# Patient Record
Sex: FEMALE | Race: BLACK OR AFRICAN AMERICAN | NOT HISPANIC OR LATINO | URBAN - METROPOLITAN AREA
[De-identification: names, ages, dates, MRNs, and addresses within clinical notes are randomized per-mention and may not be internally consistent; named-entity substitution may affect disease eponyms.]

---
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
## 2019-11-19 ENCOUNTER — EMERGENCY (EMERGENCY)
Facility: HOSPITAL | Age: 19
LOS: 1 days | Discharge: HOME | End: 2019-11-19
Attending: EMERGENCY MEDICINE
Payer: MEDICAID

## 2019-11-19 VITALS
WEIGHT: 134.92 LBS | DIASTOLIC BLOOD PRESSURE: 81 MMHG | RESPIRATION RATE: 16 BRPM | TEMPERATURE: 98 F | HEIGHT: 63 IN | HEART RATE: 81 BPM | SYSTOLIC BLOOD PRESSURE: 113 MMHG | OXYGEN SATURATION: 99 %

## 2019-11-19 LAB — HCG UR QL: NEGATIVE — SIGNIFICANT CHANGE UP

## 2019-11-19 PROCEDURE — 87491 CHLMYD TRACH DNA AMP PROBE: CPT

## 2019-11-19 PROCEDURE — 81025 URINE PREGNANCY TEST: CPT

## 2019-11-19 PROCEDURE — 99283 EMERGENCY DEPT VISIT LOW MDM: CPT

## 2019-11-19 PROCEDURE — 87591 N.GONORRHOEAE DNA AMP PROB: CPT

## 2019-11-19 NOTE — ED PROVIDER NOTE - PATIENT PORTAL LINK FT
You can access the FollowMyHealth Patient Portal offered by [facility] by registering at the following website: [email or web address]. By joining [employer]’s FollowMyHealth portal, you will also be able to view your health information using other applications (apps) compatible with our system.

## 2019-11-19 NOTE — ED PROVIDER NOTE - NSFOLLOWUPINSTRUCTIONS_ED_ALL_ED_FT
Please [facility] with [facility] or your Gyn as planned'    We will call you if the Urine Testing results positive Tomorrow or Thursday.      Menstruation  Menstruation, also known as a menstrual period, is the monthly shedding of the lining of the uterus. The uterus is the organ in the lower abdomen where a baby grows during pregnancy. Menstruation involves the passing of blood, tissue, fluid, and mucus. The flow of blood usually occurs during 3–7 consecutive days each month.  Girls usually start their periods between the ages of 12 and 14, but some girls may be older or younger when they start their period. Some girls have regular monthly menstrual cycles right from the beginning. However, it is not unusual to have only a couple of drops of blood or spotting when first starting to have periods. It is also not unusual to have two periods a month or miss a month or two when first starting to have periods. Women will continue to have periods until they reach menopause, which usually occurs between the ages of 48 and 55.  What are the symptoms?  During your period, you pass blood, tissue, fluid, and mucus out of your vagina. Periods are different for each woman and girl. You may experience:  Bleeding that lasts for 3–7 days. A little more or less bleeding is normal. Occasional heavy bleeding. Cramps in the lower abdomen. Aching or pain in the lower back area. Sore breasts .Dizziness. Nausea. Diarrhea. Other symptoms may occur 5–10 days before your menstrual period starts. These symptoms are referred to as premenstrual syndrome (PMS). These symptoms can include:  Headache. Breast tenderness and swelling. Bloating. Tiredness (fatigue).Mood changes. Craving for certain foods.  How does the menstrual cycle happen?  A period is part of a woman's menstrual cycle, which is a series of changes that the body goes through to get ready to become pregnant. The menstrual cycle usually lasts about 28 days, meaning that you will get your period about every 28 days if you do not get pregnant. However, some women get their periods as soon as every 21 days or as late as every 35 days.  Hormones control the menstrual cycle. Hormones are chemicals that the body produces to regulate different body functions. These hormones trigger changes in your uterus. Every month, the lining of your uterus gets thicker to prepare for pregnancy. And every month that you do not get pregnant, your uterus gets rid of its thick lining and cleans itself out. This is your period.  How do I know if my period is not normal?  Periods are different for everyone. Your period may last for a longer or shorter time than usual, and bleeding may be light or heavy.  Signs that your period may not be normal include:  Bleeding very heavily, such as soaking through a tampon or pad in 1–2 hours .Bleeding for many more days than normal. Bleeding after you have sex. Cramps that are so painful that you cannot do your daily activities. Cramps that get much worse than they used to be. Bleeding in between periods. Missing your period for longer than 3 months. Your menstrual cycle becoming irregular, when it used to be regular.  Follow these instructions at home:  Keep track of your periods by using a calendar. If you use tampons, use the least absorbent possible to avoid complications such as toxic shock syndrome. Do not leave tampons in the vagina overnight or longer than 8 hours. Wear a sanitary pad overnight. To help relieve pain and discomfort during your period:  Take an over-the-counter pain reliever as told by your health care provider. Use a heating pad or heat wrap on your abdomen to ease cramping. Exercise 3–5 times a week or more. Avoid foods and drinks that you know will make your symptoms worse before or during your period. This includes foods that contain:  Caffeine. Salt. Sugar.   Contact a health care provider if:  You have signs that your period may not be normal. You develop a fever with your period. Your periods are lasting more than 7 days. You develop clots with your period and never had clots before. You cannot get relief for your symptoms from over-the-counter medicine. Your period has not started, and it has been longer than 35 days.   Get help right away if:  Your period is so heavy that you have to change pads or tampons every 30 minutes. You have any symptoms of toxic shock syndrome (TSS), such as:  A high fever. Vomiting or diarrhea. Red skin that looks like a sunburn. Red eyes. Fainting or feeling dizzy. Sore throat. Muscle aches. If you develop any of these symptoms, visit your health care provider immediately. TSS is a serious health condition that can be caused by wearing a tampon for too long.  Summary  Menstruation, also known as a menstrual period, is the monthly shedding of the lining of the uterus. During your period, you pass blood, tissue, fluid, and mucus out of your vagina. Keep track of your periods by using a calendar. Contact a health care provider if you have signs that your period may not be normal. This information is not intended to replace advice given to you by your health care provider. Make sure you discuss any questions you have with your health care provider.

## 2019-11-19 NOTE — ED PROVIDER NOTE - PROGRESS NOTE DETAILS
HCG neg. Exam benign. C/w normal menses. Has f/u with [facility] this coming week. Supportive care at home. Return precautions discussed

## 2019-11-19 NOTE — ED PROVIDER NOTE - NSFOLLOWUPCLINICS_GEN_ALL_ED_FT
[facility] Gynecology and Obstetrics  Gynceology/OB  [address] [place] [address]  Phone: [phone]  Fax:   Follow Up Time: Routine

## 2019-11-19 NOTE — ED PROVIDER NOTE - OBJECTIVE STATEMENT
19y female with no significant PMH presenting with vaginal bleeding. She went to [facility] on Oct[facility] 6[facility] and had a medication induced , took a pill, did not know name of pill. She was 6 weeks at that time. She developed cramping, nausea and vaginal bleeding that day, she had vaginal bleeding for a few days and then had spotting until 4 days ago when the bleeding increased, associated crampy abdominal pain. Denies nausea, vomiting, weakness, fatigue. No history of anemia. 19y female with no significant PMH presenting with vaginal bleeding. She went to [facility] on Oct. 6th and had a medication induced , took a pill, did not know name of pill. She was 6 weeks at that time. She developed cramping, nausea and vaginal bleeding that day, she had vaginal bleeding for a few days and then had spotting that nearly resolved until 4 days ago when the bleeding increased, associated crampy abdominal pain, c/w her prior usual periods. Denies nausea, vomiting, weakness, fatigue. No history of anemia.

## 2019-11-19 NOTE — ED ADULT NURSE NOTE - OBJECTIVE STATEMENT
Pt presents for eval of continued vaginal bleeding after taking " pill" on 10/6.  Bleeding wad initially heavy, then subsided but increased a few days ago. She had cramping earlier as well, but that has resolved at this time.  No lightheadedness.

## 2019-11-19 NOTE — ED PROVIDER NOTE - NS ED ROS FT
Gen: No fever, No chills  Resp: No SOB  Cardiovascular: No chest pain or palpitations  GI: + crampy lower abdominal pain. No nausea/vomiting  :  No change in urine output or frequency; no dysuria  Neuro: No headache; No weakness  Remainder negative, except as per the HPI

## 2019-11-19 NOTE — ED PROVIDER NOTE - ATTENDING CONTRIBUTION TO CARE
Nevaeh Velez MD - Attending Physician: I have personally seen and examined this patient with the resident/fellow.  I have fully participated in the care of this patient. I have reviewed all pertinent clinical information, including history, physical exam, plan and the Resident/Fellow’s note and agree except as noted. See MDM

## 2019-11-19 NOTE — ED PROVIDER NOTE - CLINICAL SUMMARY MEDICAL DECISION MAKING FREE TEXT BOX
19y female with vaginal bleeding. Had an  6.5wks ago, had spotting since , developed increased bleeding 4 days ago. Likely resumption of her normal period, spotting to be expected after , concern for retained products but less likely given no fevers, n/v, will check upreg, and check gc/chlamydia. 19y female with vaginal bleeding. Had an  6.5wks ago, had spotting since , developed increased bleeding 4 days ago. Likely resumption of her normal period, spotting to be expected after , concern for retained products but less likely given no fevers, n/v, will check upreg, and check gc/chlamydia.    Nevaeh Velez MD - Attending Physician: Pt here with vaginal bleeding x 4 days, recent medical  approx 6 weeks ago. History and exam c/w resumption of normal Menses. No concern for anemia. Check HCG, pt wants GC/Chl testing

## 2019-11-19 NOTE — ED PROVIDER NOTE - PHYSICAL EXAMINATION
Gen: AAOx3, non-toxic  HEENT: normal conjunctiva  Lung: CTAB, no respiratory distress, no wheezes/rhonchi/rales B/L, speaking in full sentences  CV: RRR, no murmurs, rubs or gallops  Abd: soft, NTND, no guarding, no CVA tenderness  MSK: no visible deformities  Skin: Warm, well perfused, no rash  Psych: normal affect. Gen: AAOx3, non-toxic  HEENT: normal conjunctiva  Lung: CTAB, no respiratory distress, no wheezes/rhonchi/rales B/L, speaking in full sentences  CV: RRR, no murmurs, rubs or gallops  Abd: soft, NTND, no guarding, no CVA tenderness  : Chaperoned by MD Nettles: small amount dark blood in vault, no active bleeding, no pooling  MSK: no visible deformities  Skin: Warm, well perfused, no rash  Psych: normal affect.

## 2019-11-20 LAB
C TRACH RRNA SPEC QL NAA+PROBE: SIGNIFICANT CHANGE UP
N GONORRHOEA RRNA SPEC QL NAA+PROBE: SIGNIFICANT CHANGE UP
SPECIMEN SOURCE: SIGNIFICANT CHANGE UP

## 2020-02-14 ENCOUNTER — EMERGENCY (EMERGENCY)
Facility: HOSPITAL | Age: 20
LOS: 1 days | Discharge: HOME | End: 2020-02-14
Attending: EMERGENCY MEDICINE | Admitting: EMERGENCY MEDICINE
Payer: MEDICAID

## 2020-02-14 VITALS
RESPIRATION RATE: 16 BRPM | OXYGEN SATURATION: 100 % | TEMPERATURE: 98 F | HEART RATE: 72 BPM | DIASTOLIC BLOOD PRESSURE: 65 MMHG | SYSTOLIC BLOOD PRESSURE: 120 MMHG

## 2020-02-14 PROCEDURE — 73120 X-RAY EXAM OF HAND: CPT | Mod: 26,LT

## 2020-02-14 PROCEDURE — 73110 X-RAY EXAM OF WRIST: CPT | Mod: 26,LT

## 2020-02-14 PROCEDURE — 99284 EMERGENCY DEPT VISIT MOD MDM: CPT

## 2020-02-14 RX ORDER — IBUPROFEN 200 MG
600 TABLET ORAL ONCE
Refills: 0 | Status: COMPLETED | OUTPATIENT
Start: 2020-02-14 | End: 2020-02-14

## 2020-02-14 RX ADMIN — Medication 600 MILLIGRAM(S): at 22:58

## 2020-02-14 NOTE — ED ADULT TRIAGE NOTE - CHIEF COMPLAINT QUOTE
left hand pain    pt was bowling and fell.. broke her fall with her left hand which is swollen and painful

## 2020-02-14 NOTE — ED PROVIDER NOTE - PATIENT PORTAL LINK FT
You can access the FollowMyHealth Patient Portal offered by [facility] by registering at the following website: [email or web address]. By joining [employer]’s FollowMyHealth portal, you will also be able to view your health information using other applications (apps) compatible with our system.

## 2020-02-14 NOTE — ED PROVIDER NOTE - NSFOLLOWUPINSTRUCTIONS_ED_ALL_ED_FT
Your XR showed a fracture of a wrist bone.  Splint until follow up with Orthopedics and repeat Xray.  Elevation, Ice, Ibuprofen (Motrin or Advil) 400mg up to 3x per day with food, for pain and swelling.  Follow up with your doctor or return for worsening pain, fever, redness, swelling, numbness, weakness or any signs of distress.

## 2020-02-14 NOTE — ED PROVIDER NOTE - OBJECTIVE STATEMENT
19 y/o female with no pertinent PMHx presents to the ED s/p slip and fall. Pt states slipped and fell yesterday while bowling fell onto outstretched arm, unsure the mechanism of the fall. Pt notes initial had no c/o pain and went home and rest. Pt then woke the next day with severe pain and swelling to dorsum of the Lt hand along with finger tingling, but able to range all finger. Of note Pt with no Hx of Fx and with no other pain or complaint.

## 2020-02-14 NOTE — ED PROVIDER NOTE - CLINICAL SUMMARY MEDICAL DECISION MAKING FREE TEXT BOX
19 y/o female here with fall onto outstretched arm. Plan for XR wrist and hand, NSAIDs, likely splint, and outpatient f/u.

## 2020-02-14 NOTE — ED PROVIDER NOTE - PHYSICAL EXAMINATION
LUE good distal pulses FROM of all digits   +Subject parasthenia to dorsum of digits.  +Tenderness to radial wrist and dorsum at base of first metacarpal   +Swelling between 2-3 metacarpals no ecchymosis  Elbow forearm and shoulder nontender.

## 2020-02-15 NOTE — ED ADULT NURSE NOTE - OBJECTIVE STATEMENT
Pt received in intake rm #12, pt arrives to ED after falling and injuring left hand. Pt seem and evaluated by MD. Pt left hand splinted by provider pt to be DC at this time.

## 2020-02-15 NOTE — ED ADULT NURSE NOTE - NSIMPLEMENTINTERV_GEN_ALL_ED
Implemented All Fall Risk Interventions:  [place] to call system. Call bell, personal items and telephone within reach. Instruct patient to call for assistance. Room bathroom lighting operational. Non-slip footwear when patient is off stretcher. Physically safe environment: no spills, clutter or unnecessary equipment. Stretcher in lowest position, wheels locked, appropriate side rails in place. Provide visual cue, wrist band, yellow gown, etc. Monitor gait and stability. Monitor for mental status changes and reorient to person, place, and time. Review medications for side effects contributing to fall risk. Reinforce activity limits and safety measures with patient and family.

## 2020-02-15 NOTE — ED ADULT NURSE REASSESSMENT NOTE - NS ED NURSE REASSESS COMMENT FT1
Pt DC'd after evaluation by ED provider and after test results were resulted. Pt in no distress. DC home with significant other

## 2020-02-28 ENCOUNTER — APPOINTMENT (OUTPATIENT)
Dept: ORTHOPEDIC SURGERY | Facility: CLINIC | Age: 20
End: 2020-02-28
Payer: MEDICAID

## 2020-02-28 VITALS
BODY MASS INDEX: 28.34 KG/M2 | HEIGHT: 62 IN | DIASTOLIC BLOOD PRESSURE: 78 MMHG | SYSTOLIC BLOOD PRESSURE: 120 MMHG | WEIGHT: 154 LBS | HEART RATE: 94 BPM

## 2020-02-28 DIAGNOSIS — S62.112D DISPLACED FRACTURE OF TRIQUETRUM [CUNEIFORM] BONE, LEFT WRIST, SUBSEQUENT ENCOUNTER FOR FRACTURE WITH ROUTINE HEALING: ICD-10-CM

## 2020-02-28 PROCEDURE — 99204 OFFICE O/P NEW MOD 45 MIN: CPT

## 2020-02-28 PROCEDURE — 73130 X-RAY EXAM OF HAND: CPT | Mod: LT

## 2020-02-28 NOTE — ASSESSMENT
[FreeTextEntry1] : 20 year old female presents with left triquetral avulsion fracture.will wear brace x 4 weeks total. Return appointment will be scheduled for two weeks with repeat XR.

## 2020-02-28 NOTE — PHYSICAL EXAM
[de-identified] : Three views of the left wrist were obtained and reviewed. Triquetral avulsion fracture appreciated. No SL widening on  view. No foreign body. Joint space is well maintained.  [de-identified] : Constitutional: Alert and in no acute distress.\par Psychiatric: Oriented to person, place, and time. Insight and judgement were intact and the affect was normal.\par Cardiovascular: Regular rate assessed through peripheral pulses.\par Pulmonary: Nonlabored breathing on room air.\par Lymphatics: No peripheral lymphadenopathy appreciated.\par \par Musculoskeletal: \par Skin is intact. There is tenderness to palpation over the left triquetrum with deformity and swelling. Remainder of the extremity is atraumatic. There is no tenderness over the volar tubercle of the scaphoid or anatomic snuffbox.\par \par Mobility is full about the shoulders and elbows. Digital flexion and extension is full. Thumb opposition is full to the base of the small fingers bilaterally. \par \par Sensation is intact to light touch in the ulnar, median, and radial nerve distributions. Motor is intact in the ulnar, median, and radial nerve distributions. Fingers are pink and well perfused. Capillary refill is brisk.

## 2020-02-28 NOTE — HISTORY OF PRESENT ILLNESS
[Right] : right hand dominant [FreeTextEntry1] : She presents today for evaluation of her left triquetral avulsion fracture \par Date of injury: 12/13/20\par Method of injury: ground level fall \par \par The patient initially presented to the emergency room where XR were obtained and the fracture was diagnosed.  A splint was applied and referral submitted to hand surgery for further follow-up. She continues to have dorsal wrist pain. She describes the pain as 4/10. The pain is described as intermittent. Ice helps alleviate the pain. She has been taking over the counter tylenol to help improve the pain. Sleep worsens pain. She does have a history of a pulmonary embolism after gettign the Depo shot. She is not currently taking blood thinners\par

## 2020-03-12 ENCOUNTER — EMERGENCY (EMERGENCY)
Facility: HOSPITAL | Age: 20
LOS: 1 days | Discharge: HOME | End: 2020-03-12
Attending: EMERGENCY MEDICINE | Admitting: EMERGENCY MEDICINE
Payer: MEDICAID

## 2020-03-12 VITALS
OXYGEN SATURATION: 100 % | DIASTOLIC BLOOD PRESSURE: 63 MMHG | SYSTOLIC BLOOD PRESSURE: 126 MMHG | RESPIRATION RATE: 16 BRPM | HEART RATE: 86 BPM | TEMPERATURE: 99 F

## 2020-03-12 VITALS
HEART RATE: 81 BPM | DIASTOLIC BLOOD PRESSURE: 59 MMHG | TEMPERATURE: 98 F | SYSTOLIC BLOOD PRESSURE: 101 MMHG | RESPIRATION RATE: 16 BRPM | OXYGEN SATURATION: 98 %

## 2020-03-12 LAB
ALBUMIN SERPL ELPH-MCNC: 4.3 G/DL — SIGNIFICANT CHANGE UP (ref 3.3–5)
ALP SERPL-CCNC: 47 U/L — SIGNIFICANT CHANGE UP (ref 40–120)
ALT FLD-CCNC: 18 U/L — SIGNIFICANT CHANGE UP (ref 4–33)
ANION GAP SERPL CALC-SCNC: 10 MMO/L — SIGNIFICANT CHANGE UP (ref 7–14)
APTT BLD: 32.6 SEC — SIGNIFICANT CHANGE UP (ref 27.5–36.3)
AST SERPL-CCNC: 22 U/L — SIGNIFICANT CHANGE UP (ref 4–32)
BILIRUB SERPL-MCNC: < 0.2 MG/DL — LOW (ref 0.2–1.2)
BUN SERPL-MCNC: 12 MG/DL — SIGNIFICANT CHANGE UP (ref 7–23)
CALCIUM SERPL-MCNC: 9.6 MG/DL — SIGNIFICANT CHANGE UP (ref 8.4–10.5)
CHLORIDE SERPL-SCNC: 102 MMOL/L — SIGNIFICANT CHANGE UP (ref 98–107)
CO2 SERPL-SCNC: 24 MMOL/L — SIGNIFICANT CHANGE UP (ref 22–31)
CREAT SERPL-MCNC: 1.01 MG/DL — SIGNIFICANT CHANGE UP (ref 0.5–1.3)
D DIMER BLD IA.RAPID-MCNC: < 150 NG/ML — SIGNIFICANT CHANGE UP
GLUCOSE SERPL-MCNC: 105 MG/DL — HIGH (ref 70–99)
HCT VFR BLD CALC: 39.3 % — SIGNIFICANT CHANGE UP (ref 34.5–45)
HGB BLD-MCNC: 12.7 G/DL — SIGNIFICANT CHANGE UP (ref 11.5–15.5)
INR BLD: 1 — SIGNIFICANT CHANGE UP (ref 0.88–1.17)
MCHC RBC-ENTMCNC: 26.6 PG — LOW (ref 27–34)
MCHC RBC-ENTMCNC: 32.3 % — SIGNIFICANT CHANGE UP (ref 32–36)
MCV RBC AUTO: 82.4 FL — SIGNIFICANT CHANGE UP (ref 80–100)
NRBC # FLD: 0 K/UL — SIGNIFICANT CHANGE UP (ref 0–0)
NT-PROBNP SERPL-SCNC: 57.75 PG/ML — SIGNIFICANT CHANGE UP
PLATELET # BLD AUTO: 306 K/UL — SIGNIFICANT CHANGE UP (ref 150–400)
PMV BLD: 8.9 FL — SIGNIFICANT CHANGE UP (ref 7–13)
POTASSIUM SERPL-MCNC: 4.1 MMOL/L — SIGNIFICANT CHANGE UP (ref 3.5–5.3)
POTASSIUM SERPL-SCNC: 4.1 MMOL/L — SIGNIFICANT CHANGE UP (ref 3.5–5.3)
PROT SERPL-MCNC: 7.9 G/DL — SIGNIFICANT CHANGE UP (ref 6–8.3)
PROTHROM AB SERPL-ACNC: 11.4 SEC — SIGNIFICANT CHANGE UP (ref 9.8–13.1)
RBC # BLD: 4.77 M/UL — SIGNIFICANT CHANGE UP (ref 3.8–5.2)
RBC # FLD: 12.1 % — SIGNIFICANT CHANGE UP (ref 10.3–14.5)
SODIUM SERPL-SCNC: 136 MMOL/L — SIGNIFICANT CHANGE UP (ref 135–145)
TROPONIN T, HIGH SENSITIVITY: < 6 NG/L — SIGNIFICANT CHANGE UP (ref ?–14)
WBC # BLD: 6.94 K/UL — SIGNIFICANT CHANGE UP (ref 3.8–10.5)
WBC # FLD AUTO: 6.94 K/UL — SIGNIFICANT CHANGE UP (ref 3.8–10.5)

## 2020-03-12 PROCEDURE — 71275 CT ANGIOGRAPHY CHEST: CPT | Mod: 26

## 2020-03-12 PROCEDURE — 99284 EMERGENCY DEPT VISIT MOD MDM: CPT

## 2020-03-12 NOTE — ED PROVIDER NOTE - CLINICAL SUMMARY MEDICAL DECISION MAKING FREE TEXT BOX
21 y/o female with hx of PE. Symptoms consistent with PE. No evidence of infection. Plan for CAT scan and PE related blood work. 19 y/o female with hx of PE. Symptoms consistent with PE. No evidence of infection. HEART score low (ie, not likely CAD-related). Plan for CT scan and PE-related blood work.

## 2020-03-12 NOTE — ED PROVIDER NOTE - OBJECTIVE STATEMENT
21 y/o female presents to the ED c/o difficulty breathing/SOB for x2 days. Pt states she has hx of pulmonary embolism and this episode feels similar. Pt reports needing to take deep breaths in order to feel better. Denies chest pain. Denies recent long periods of sitting.    PMHx: Pulmonary Embolism.  Medications: Xarelto.  Allergies: NKDA  PSHx: None.  Immunizations up to date.

## 2020-03-12 NOTE — ED PROVIDER NOTE - PATIENT PORTAL LINK FT
You can access the FollowMyHealth Patient Portal offered by [facility] by registering at the following website: [email or web address]. By joining [employer]’s FollowMyHealth portal, you will also be able to view your health information using other applications (apps) compatible with our system.

## 2020-03-12 NOTE — ED ADULT NURSE NOTE - OBJECTIVE STATEMENT
Pt is a 20 yr old female walk in triage, SOB for 2 days. denies pain, medical history. Labs collected and CTA ordered. Pt is a 20 yr old female walk in triage, SOB for 2 days. denies pain. patient admits to past PE year or 2 ago with temporary Xorelto use. Labs collected and CTA ordered.

## 2020-07-05 ENCOUNTER — EMERGENCY (EMERGENCY)
Facility: HOSPITAL | Age: 20
LOS: 1 days | Discharge: HOME | End: 2020-07-05
Attending: EMERGENCY MEDICINE | Admitting: EMERGENCY MEDICINE
Payer: MEDICAID

## 2020-07-05 VITALS
SYSTOLIC BLOOD PRESSURE: 123 MMHG | HEART RATE: 94 BPM | RESPIRATION RATE: 18 BRPM | DIASTOLIC BLOOD PRESSURE: 71 MMHG | TEMPERATURE: 98 F | OXYGEN SATURATION: 100 %

## 2020-07-05 PROCEDURE — 99283 EMERGENCY DEPT VISIT LOW MDM: CPT

## 2020-07-05 RX ORDER — TETANUS TOXOID, REDUCED DIPHTHERIA TOXOID AND ACELLULAR PERTUSSIS VACCINE, ADSORBED 5; 2.5; 8; 8; 2.5 [IU]/.5ML; [IU]/.5ML; UG/.5ML; UG/.5ML; UG/.5ML
0.5 SUSPENSION INTRAMUSCULAR ONCE
Refills: 0 | Status: COMPLETED | OUTPATIENT
Start: 2020-07-05 | End: 2020-07-05

## 2020-07-05 RX ORDER — ACETAMINOPHEN 500 MG
975 TABLET ORAL ONCE
Refills: 0 | Status: COMPLETED | OUTPATIENT
Start: 2020-07-05 | End: 2020-07-05

## 2020-07-05 RX ADMIN — Medication 975 MILLIGRAM(S): at 12:23

## 2020-07-05 RX ADMIN — TETANUS TOXOID, REDUCED DIPHTHERIA TOXOID AND ACELLULAR PERTUSSIS VACCINE, ADSORBED 0.5 MILLILITER(S): 5; 2.5; 8; 8; 2.5 SUSPENSION INTRAMUSCULAR at 12:46
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
## 2020-07-05 NOTE — ED ADULT NURSE NOTE - NSIMPLEMENTINTERV_GEN_ALL_ED
Implemented All Fall Risk Interventions:  [place] to call system. Call bell, personal items and telephone within reach. Instruct patient to call for assistance. Room bathroom lighting operational. Non-slip footwear when patient is off stretcher. Physically safe environment: no spills, clutter or unnecessary equipment. Stretcher in lowest position, wheels locked, appropriate side rails in place. Provide visual cue, wrist band, yellow gown, etc. Monitor gait and stability. Monitor for mental status changes and reorient to person, place, and time. Review medications for side effects contributing to fall risk. Reinforce activity limits and safety measures with patient and family.
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
## 2020-07-05 NOTE — ED ADULT TRIAGE NOTE - CHIEF COMPLAINT QUOTE
Pt states yesterday she fell backwards hitting the back of her head  . Pt denies LOC reports bump to back of head and pain. Pt denies any nausea or vomiting with headache.
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
## 2020-07-05 NOTE — ED PROVIDER NOTE - PATIENT PORTAL LINK FT
You can access the FollowMyHealth Patient Portal offered by [facility] by registering at the following website: [email or web address]. By joining [employer]’s FollowMyHealth portal, you will also be able to view your health information using other applications (apps) compatible with our system.

## 2020-07-06 PROBLEM — I26.99 OTHER PULMONARY EMBOLISM WITHOUT ACUTE COR PULMONALE: Chronic | Status: ACTIVE | Noted: 2020-03-12

## 2020-07-29 ENCOUNTER — EMERGENCY (EMERGENCY)
Facility: HOSPITAL | Age: 20
LOS: 1 days | Discharge: HOME | End: 2020-07-29
Attending: EMERGENCY MEDICINE | Admitting: EMERGENCY MEDICINE
Payer: MEDICAID

## 2020-07-29 VITALS
TEMPERATURE: 99 F | HEART RATE: 84 BPM | DIASTOLIC BLOOD PRESSURE: 55 MMHG | OXYGEN SATURATION: 100 % | RESPIRATION RATE: 16 BRPM | SYSTOLIC BLOOD PRESSURE: 100 MMHG

## 2020-07-29 VITALS
DIASTOLIC BLOOD PRESSURE: 62 MMHG | RESPIRATION RATE: 16 BRPM | OXYGEN SATURATION: 100 % | SYSTOLIC BLOOD PRESSURE: 111 MMHG | TEMPERATURE: 99 F | HEART RATE: 78 BPM

## 2020-07-29 LAB
ALBUMIN SERPL ELPH-MCNC: 4.2 G/DL — SIGNIFICANT CHANGE UP (ref 3.3–5)
ALP SERPL-CCNC: 49 U/L — SIGNIFICANT CHANGE UP (ref 40–120)
ALT FLD-CCNC: 16 U/L — SIGNIFICANT CHANGE UP (ref 4–33)
ANION GAP SERPL CALC-SCNC: 9 MMO/L — SIGNIFICANT CHANGE UP (ref 7–14)
AST SERPL-CCNC: 22 U/L — SIGNIFICANT CHANGE UP (ref 4–32)
BASOPHILS # BLD AUTO: 0.03 K/UL — SIGNIFICANT CHANGE UP (ref 0–0.2)
BASOPHILS NFR BLD AUTO: 0.4 % — SIGNIFICANT CHANGE UP (ref 0–2)
BILIRUB SERPL-MCNC: < 0.2 MG/DL — LOW (ref 0.2–1.2)
BUN SERPL-MCNC: 13 MG/DL — SIGNIFICANT CHANGE UP (ref 7–23)
CALCIUM SERPL-MCNC: 9.1 MG/DL — SIGNIFICANT CHANGE UP (ref 8.4–10.5)
CHLORIDE SERPL-SCNC: 103 MMOL/L — SIGNIFICANT CHANGE UP (ref 98–107)
CO2 SERPL-SCNC: 25 MMOL/L — SIGNIFICANT CHANGE UP (ref 22–31)
CREAT SERPL-MCNC: 0.95 MG/DL — SIGNIFICANT CHANGE UP (ref 0.5–1.3)
EOSINOPHIL # BLD AUTO: 0.13 K/UL — SIGNIFICANT CHANGE UP (ref 0–0.5)
EOSINOPHIL NFR BLD AUTO: 1.6 % — SIGNIFICANT CHANGE UP (ref 0–6)
GLUCOSE SERPL-MCNC: 93 MG/DL — SIGNIFICANT CHANGE UP (ref 70–99)
HCT VFR BLD CALC: 40.6 % — SIGNIFICANT CHANGE UP (ref 34.5–45)
HGB BLD-MCNC: 13 G/DL — SIGNIFICANT CHANGE UP (ref 11.5–15.5)
IMM GRANULOCYTES NFR BLD AUTO: 0.5 % — SIGNIFICANT CHANGE UP (ref 0–1.5)
LYMPHOCYTES # BLD AUTO: 1.86 K/UL — SIGNIFICANT CHANGE UP (ref 1–3.3)
LYMPHOCYTES # BLD AUTO: 23 % — SIGNIFICANT CHANGE UP (ref 13–44)
MCHC RBC-ENTMCNC: 26.7 PG — LOW (ref 27–34)
MCHC RBC-ENTMCNC: 32 % — SIGNIFICANT CHANGE UP (ref 32–36)
MCV RBC AUTO: 83.4 FL — SIGNIFICANT CHANGE UP (ref 80–100)
MONOCYTES # BLD AUTO: 0.73 K/UL — SIGNIFICANT CHANGE UP (ref 0–0.9)
MONOCYTES NFR BLD AUTO: 9 % — SIGNIFICANT CHANGE UP (ref 2–14)
NEUTROPHILS # BLD AUTO: 5.31 K/UL — SIGNIFICANT CHANGE UP (ref 1.8–7.4)
NEUTROPHILS NFR BLD AUTO: 65.5 % — SIGNIFICANT CHANGE UP (ref 43–77)
NRBC # FLD: 0 K/UL — SIGNIFICANT CHANGE UP (ref 0–0)
PLATELET # BLD AUTO: 306 K/UL — SIGNIFICANT CHANGE UP (ref 150–400)
PMV BLD: 8.8 FL — SIGNIFICANT CHANGE UP (ref 7–13)
POTASSIUM SERPL-MCNC: 4.2 MMOL/L — SIGNIFICANT CHANGE UP (ref 3.5–5.3)
POTASSIUM SERPL-SCNC: 4.2 MMOL/L — SIGNIFICANT CHANGE UP (ref 3.5–5.3)
PROT SERPL-MCNC: 7.4 G/DL — SIGNIFICANT CHANGE UP (ref 6–8.3)
RBC # BLD: 4.87 M/UL — SIGNIFICANT CHANGE UP (ref 3.8–5.2)
RBC # FLD: 11.8 % — SIGNIFICANT CHANGE UP (ref 10.3–14.5)
SODIUM SERPL-SCNC: 137 MMOL/L — SIGNIFICANT CHANGE UP (ref 135–145)
WBC # BLD: 8.1 K/UL — SIGNIFICANT CHANGE UP (ref 3.8–10.5)
WBC # FLD AUTO: 8.1 K/UL — SIGNIFICANT CHANGE UP (ref 3.8–10.5)

## 2020-07-29 PROCEDURE — 99284 EMERGENCY DEPT VISIT MOD MDM: CPT

## 2020-07-29 RX ORDER — KETOROLAC TROMETHAMINE 30 MG/ML
30 SYRINGE (ML) INJECTION ONCE
Refills: 0 | Status: DISCONTINUED | OUTPATIENT
Start: 2020-07-29 | End: 2020-07-29

## 2020-07-29 RX ORDER — SODIUM CHLORIDE 9 MG/ML
1000 INJECTION INTRAMUSCULAR; INTRAVENOUS; SUBCUTANEOUS ONCE
Refills: 0 | Status: COMPLETED | OUTPATIENT
Start: 2020-07-29 | End: 2020-07-29

## 2020-07-29 RX ADMIN — SODIUM CHLORIDE 1000 MILLILITER(S): 9 INJECTION INTRAMUSCULAR; INTRAVENOUS; SUBCUTANEOUS at 12:00

## 2020-07-29 RX ADMIN — Medication 30 MILLIGRAM(S): at 12:00

## 2020-07-29 NOTE — ED PROVIDER NOTE - PATIENT PORTAL LINK FT
You can access the FollowMyHealth Patient Portal offered by [facility] by registering at the following website: [email or web address]. By joining [employer]’s FollowMyHealth portal, you will also be able to view your health information using other applications (apps) compatible with our system.

## 2020-07-29 NOTE — ED PROVIDER NOTE - ATTENDING CONTRIBUTION TO CARE
Attending note:   After face to face evaluation of this patient, I concur with above noted hx, pe, and care plan for this patient.  Tan: 20 yof with 5 days of lower abd crampy pain worse with food intake, diffuse pain and then improves after loose stools. Initially stool was watery but has improved to loose and soft now. No fever, no chills, scared to eat, no urinary sxs, +boyfriend also with similar sxs, they ate a deli sandwich prior. Pain radiating to back and legs as well. On exam, pt is very well appearing, no distress, clear lungs, normal cardiac, abd soft with minimal rlq tenderness. no cvat. no rebound, no guarding. Pt is well appearing and given similar sxs to significant other likely infectious from food. Unlikely to be appy or ovarian since boyfriend has exact same sxs. Labs, pain meds, fluids and reassess. Given pt's young age, would not scan unless pain worsens, elevated wbc or new symptoms.

## 2020-07-29 NOTE — ED ADULT NURSE NOTE - CHPI ED NUR SYMPTOMS NEG
no blood in stool/no nausea/no burning urination/no hematuria/no vomiting/no chills/no dysuria/no fever

## 2020-07-29 NOTE — ED ADULT NURSE NOTE - CHIEF COMPLAINT QUOTE
C/o abd cramping and lower back pain x4 days, denies N/V/D/ urinary symptoms/ vaginal bleeding. Denies PMH. LMP 7/17 Followed protocol

## 2020-07-29 NOTE — ED ADULT NURSE NOTE - OBJECTIVE STATEMENT
21 y/o female presents to ED with multiple complaints.  Pt states that for the past 5 days she has been having loss of appetite, cramping to pelvis, back and thighs that improves with ambulation and episodes of diarrhea that started out as liquid and has improved to softer than normal stool.  Pt denies fever chills, no n/v.  Pt states that her SO has same sx and he attributes them to a sandwich that they shared on Saturday.  Pt awake alert in NAD, well appearing.  IV established, labs drawn and sent, pt medicated as per order.  Providers evaluating.

## 2020-07-29 NOTE — ED ADULT TRIAGE NOTE - CHIEF COMPLAINT QUOTE
C/o abd cramping and lower back pain x4 days, denies N/V/D/ urinary symptoms/ vaginal bleeding. Denies PMH. LMP 7/17

## 2020-07-29 NOTE — ED PROVIDER NOTE - NSFOLLOWUPINSTRUCTIONS_ED_ALL_ED_FT
Drink plenty of fluids.  No fatty or greasy foods.  Follow up with your PCP in 2 days.  Return to ED for any worsening pain, vomiting or fever.

## 2020-07-29 NOTE — ED PROVIDER NOTE - OBJECTIVE STATEMENT
21 yo female c pmhx PE (2/2 OCP) presents to ED c/o lower abdominal cramping with low back discomfort.  Pt states has been having loose stools x 4 days after eating a deli sandwich.  Pt states her boyfriend has similar symptoms.  +mild nausea.  Pt denies any fever, vomiting, chest pain, urinary sx.

## 2020-07-29 NOTE — ED ADULT NURSE NOTE - CAS EDN DISCHARGE ASSESSMENT
Please know that our office will communicate with you as soon as we receive completed results.  Some of our specialty labs take longer than 2 weeks to receive, thus you may receive a survey prior to your results being available.  If you should receive a survey while awaiting your results or feel that it has been longer than anticipated, please feel free to call our office to discuss.     
Alert and oriented to person, place and time
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
## 2020-07-29 NOTE — ED PROVIDER NOTE - PROGRESS NOTE DETAILS
QUYNH Mary: Pt feeling better, labs wnl, return precautions given to patient for worsening pain, vomiting or fever.

## 2020-07-29 NOTE — ED PROVIDER NOTE - RESPIRATORY, MLM
Name: Patricio Bach  : 1955  MRN: [id number]    Oncology Navigation Follow-Up Note    Contact Type:  Telephone  Notes: Upon review of Epic noted pt completed Dr. Ethel Vuong consult & scheduled for EBUS 2020. Dr. Merritt Barone updated @ [facility]. MD's requested dual f/u 2020. Spoke with Janel Narayanan, [facility] , updated on pt & MD's request.  Pt scheduled 2020 @ [facility] to arrive @ 0940 for Dr. Sridevi Kovacs f/u followed by Dr. Jennifer Barone f/u. Spoke with pt via telephone, updated on dual f/u appt details. Pt verbalized understanding & thanked writer. Will continue to follow.     Electronically signed by Haseeb Land RN on 1/10/2020 at 10:03 AM
Breath sounds clear and equal bilaterally.

## 2021-06-10 ENCOUNTER — EMERGENCY (EMERGENCY)
Facility: HOSPITAL | Age: 21
LOS: 1 days | Discharge: HOME | End: 2021-06-10
Attending: STUDENT IN AN ORGANIZED HEALTH CARE EDUCATION/TRAINING PROGRAM | Admitting: STUDENT IN AN ORGANIZED HEALTH CARE EDUCATION/TRAINING PROGRAM
Payer: MEDICAID

## 2021-06-10 VITALS
HEART RATE: 74 BPM | OXYGEN SATURATION: 100 % | SYSTOLIC BLOOD PRESSURE: 105 MMHG | RESPIRATION RATE: 16 BRPM | DIASTOLIC BLOOD PRESSURE: 68 MMHG | HEIGHT: 63 IN | TEMPERATURE: 99 F

## 2021-06-10 LAB
ALBUMIN SERPL ELPH-MCNC: 4.1 G/DL — SIGNIFICANT CHANGE UP (ref 3.3–5)
ALP SERPL-CCNC: 49 U/L — SIGNIFICANT CHANGE UP (ref 40–120)
ALT FLD-CCNC: 9 U/L — SIGNIFICANT CHANGE UP (ref 4–33)
ANION GAP SERPL CALC-SCNC: 11 MMOL/L — SIGNIFICANT CHANGE UP (ref 7–14)
AST SERPL-CCNC: 17 U/L — SIGNIFICANT CHANGE UP (ref 4–32)
BASOPHILS # BLD AUTO: 0.06 K/UL — SIGNIFICANT CHANGE UP (ref 0–0.2)
BASOPHILS NFR BLD AUTO: 0.7 % — SIGNIFICANT CHANGE UP (ref 0–2)
BILIRUB SERPL-MCNC: 0.3 MG/DL — SIGNIFICANT CHANGE UP (ref 0.2–1.2)
BUN SERPL-MCNC: 13 MG/DL — SIGNIFICANT CHANGE UP (ref 7–23)
CALCIUM SERPL-MCNC: 9.2 MG/DL — SIGNIFICANT CHANGE UP (ref 8.4–10.5)
CHLORIDE SERPL-SCNC: 103 MMOL/L — SIGNIFICANT CHANGE UP (ref 98–107)
CO2 SERPL-SCNC: 23 MMOL/L — SIGNIFICANT CHANGE UP (ref 22–31)
CREAT SERPL-MCNC: 0.93 MG/DL — SIGNIFICANT CHANGE UP (ref 0.5–1.3)
D DIMER BLD IA.RAPID-MCNC: <150 NG/ML DDU — SIGNIFICANT CHANGE UP
EOSINOPHIL # BLD AUTO: 0.15 K/UL — SIGNIFICANT CHANGE UP (ref 0–0.5)
EOSINOPHIL NFR BLD AUTO: 1.7 % — SIGNIFICANT CHANGE UP (ref 0–6)
GLUCOSE SERPL-MCNC: 89 MG/DL — SIGNIFICANT CHANGE UP (ref 70–99)
HCT VFR BLD CALC: 39.7 % — SIGNIFICANT CHANGE UP (ref 34.5–45)
HGB BLD-MCNC: 12.8 G/DL — SIGNIFICANT CHANGE UP (ref 11.5–15.5)
IANC: 4.63 K/UL — SIGNIFICANT CHANGE UP (ref 1.5–8.5)
IMM GRANULOCYTES NFR BLD AUTO: 0.1 % — SIGNIFICANT CHANGE UP (ref 0–1.5)
LYMPHOCYTES # BLD AUTO: 3.41 K/UL — HIGH (ref 1–3.3)
LYMPHOCYTES # BLD AUTO: 37.5 % — SIGNIFICANT CHANGE UP (ref 13–44)
MCHC RBC-ENTMCNC: 26.7 PG — LOW (ref 27–34)
MCHC RBC-ENTMCNC: 32.2 GM/DL — SIGNIFICANT CHANGE UP (ref 32–36)
MCV RBC AUTO: 82.9 FL — SIGNIFICANT CHANGE UP (ref 80–100)
MONOCYTES # BLD AUTO: 0.83 K/UL — SIGNIFICANT CHANGE UP (ref 0–0.9)
MONOCYTES NFR BLD AUTO: 9.1 % — SIGNIFICANT CHANGE UP (ref 2–14)
NEUTROPHILS # BLD AUTO: 4.63 K/UL — SIGNIFICANT CHANGE UP (ref 1.8–7.4)
NEUTROPHILS NFR BLD AUTO: 50.9 % — SIGNIFICANT CHANGE UP (ref 43–77)
NRBC # BLD: 0 /100 WBCS — SIGNIFICANT CHANGE UP
NRBC # FLD: 0 K/UL — SIGNIFICANT CHANGE UP
PLATELET # BLD AUTO: 302 K/UL — SIGNIFICANT CHANGE UP (ref 150–400)
POTASSIUM SERPL-MCNC: 3.8 MMOL/L — SIGNIFICANT CHANGE UP (ref 3.5–5.3)
POTASSIUM SERPL-SCNC: 3.8 MMOL/L — SIGNIFICANT CHANGE UP (ref 3.5–5.3)
PROT SERPL-MCNC: 7.3 G/DL — SIGNIFICANT CHANGE UP (ref 6–8.3)
RBC # BLD: 4.79 M/UL — SIGNIFICANT CHANGE UP (ref 3.8–5.2)
RBC # FLD: 12.4 % — SIGNIFICANT CHANGE UP (ref 10.3–14.5)
SODIUM SERPL-SCNC: 137 MMOL/L — SIGNIFICANT CHANGE UP (ref 135–145)
WBC # BLD: 9.09 K/UL — SIGNIFICANT CHANGE UP (ref 3.8–10.5)
WBC # FLD AUTO: 9.09 K/UL — SIGNIFICANT CHANGE UP (ref 3.8–10.5)

## 2021-06-10 PROCEDURE — 99284 EMERGENCY DEPT VISIT MOD MDM: CPT

## 2021-06-10 PROCEDURE — 71045 X-RAY EXAM CHEST 1 VIEW: CPT | Mod: 26

## 2021-06-10 RX ORDER — FLUTICASONE PROPIONATE 50 MCG
2 SPRAY, SUSPENSION NASAL
Qty: 6 | Refills: 0
Start: 2021-06-10 | End: 2021-06-12

## 2021-06-10 RX ORDER — LORATADINE 10 MG/1
1 TABLET ORAL
Qty: 14 | Refills: 0
Start: 2021-06-10 | End: 2021-06-23

## 2021-06-10 NOTE — ED PROVIDER NOTE - OBJECTIVE STATEMENT
21y Female with PMHx of PE in 2016 thought to be due to PE presents to the ER for SOB. Patient reports 3 days of shortness of breath and nasal congestion. States it is worse at work when she is around cats and better at home. Denies use of allergy medications. Denies covid vaccine. Denies fever, chills, sore throat, cough, chest pain, abdominal pain, nausea, vomiting or diarrhea.

## 2021-06-10 NOTE — ED PROVIDER NOTE - NSFOLLOWUPINSTRUCTIONS_ED_ALL_ED_FT
Use 2 sprays in each nostril of Flonase once a day for 3 days.     Take 10mg Loratadine once a day. Read all instructions and take medications as directed.     Follow up with Pulmonology as soon as possible. See referral list.     Shortness of breath    Shortness of breath (dyspnea) means you have trouble breathing and could indicate a medical problem. Causes include lung disease, heart disease, low amount of red blood cells (anemia), poor physical fitness, being overweight, smoking, etc. Your health care provider today may not be able to find a cause for your shortness of breath after your exam. In this case, it is important to have a follow-up exam with your primary care physician as instructed. If medicines were prescribed, take them as directed for the full length of time directed. Refrain from tobacco products.    SEEK IMMEDIATE MEDICAL CARE IF YOU HAVE ANY OF THE FOLLOWING SYMPTOMS: worsening shortness of breath, chest pain, back pain, abdominal pain, fever, coughing up blood, lightheadedness/dizziness.    Advance activity as tolerated.     Continue all previously prescribed medications as directed unless otherwise instructed.     Follow up with your primary care physician in 48-72 hours- bring copies of your results.     If you have issues obtaining follow up, please call: [phone] to obtain a doctor or specialist who takes your insurance in your area.  You may call [phone] to make an appointment with the internal medicine clinic.

## 2021-06-10 NOTE — ED PROVIDER NOTE - PATIENT PORTAL LINK FT
You can access the FollowMyHealth Patient Portal offered by [facility] by registering at the following website: [email or web address]. By joining [employer]’s FollowMyHealth portal, you will also be able to view your health information using other applications (apps) compatible with our system.

## 2021-06-10 NOTE — ED PROVIDER NOTE - ATTENDING CONTRIBUTION TO CARE
22 y/o F with PMH PE in 2016 while on OCP p/w 3 days of intermittent SOB and nasal congestion. pt states SOB is worse at work.  She states she has been having worsening of the symptoms since cats were brought into her work. her sob improves when she is at home. She also reports that she has some nasal congestion. She denies fever, chills, chest pain, syncope.  Pt states that her symptoms started 3 days and included the nasal congestion and sob. She denies LE swelling . 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  denies fever, chills, chest pain, +SOB, abdominal pain, diarrhea, dysuria, syncope, bleeding, new rash,weakness, numbness, blurred vision    ROS  otherwise negative as per HPI  Gen: Awake, Alert, WD, WN, NAD  Head:  NC/AT  Eyes:  PERRL, EOMI, Conjunctiva pink, lids normal, no scleral icterus  ENT: nasal erythema w/ congestion  moist mucus membranes  Neck: supple, nontender, no meningismus, no JVD, trachea midline  Cardiac/CV:  S1 S2, RRR, no M/G/R  Respiratory/Pulm:  CTAB, good air movement, normal resp effort, no wheezes/stridor/retractions/rales/rhonchi  Gastrointestinal/Abdomen:  Soft, nontender, nondistended, +BS, no rebound/guarding  Back:  no CVAT, no MLT  Ext:  warm, well perfused, moving all extremities spontaneously, no peripheral edema, distal pulses intact  Skin: intact, no rash  Neuro:  AAOx3, sensation intact, motor 5/5 x 4 extremities, normal gait, speech clear  MDM as above

## 2021-06-10 NOTE — ED ADULT NURSE NOTE - OBJECTIVE STATEMENT
Pt received to intake rm 7 c/o SOB x 3 days. AxOx4 and ambulatory at baseline. Pt states the SOB came on all of a sudden, no triggering factors noted. Pt appears comfortable, in NAD, respirations even/unlabored, denies SOB at this time. Pt denies headache, dizziness, chest pain, nausea, vomiting, diarrhea, fever, chills, cough. PMH of PE, pt not currently on anti-coagulants. 20G IV placed to L AC, labs drawn and sent, awaiting results, will continue to monitor.

## 2021-06-10 NOTE — ED PROVIDER NOTE - NS ED ROS FT
Constitutional: (-) Fever, (-) Chills, (-) Anorexia  Eyes: (-) Visual changes, (-) Discharge, (-) Redness  Ears: (-) Hearing loss, (-)Tinnitus, (-) Ear pain  Nose: (+) Nasal congestion, (-) Runny nose  Mouth/Throat: (-) Sore throat  CV: (-) Chest pain, (-) Palpitations  Resp: (+) Shortness of breath, (-) Cough, (-) Dyspnea on Exertion, (-) Wheezing  GI: (-) Abdominal pain, (-) Nausea, (-) Vomiting, (-) Diarrhea  : (-) Dysuria, (-) Hematuria, (-) Increased frequency  MSK: (-) Weakness, (-) Myalgias, (-) Back pain, (-) Calf pain  Neuro: (-) Headache

## 2021-06-10 NOTE — ED PROVIDER NOTE - PSH
No significant past surgical history     Hatchet Flap Text: The defect edges were debeveled with a #15 scalpel blade.  Given the location of the defect, shape of the defect and the proximity to free margins a hatchet flap was deemed most appropriate.  Using a sterile surgical marker, an appropriate hatchet flap was drawn incorporating the defect and placing the expected incisions within the relaxed skin tension lines where possible.    The area thus outlined was incised deep to adipose tissue with a #15 scalpel blade.  The skin margins were undermined to an appropriate distance in all directions utilizing iris scissors.

## 2021-06-10 NOTE — ED ADULT NURSE NOTE - NSIMPLEMENTINTERV_GEN_ALL_ED
Implemented All Universal Safety Interventions:  [place] to call system. Call bell, personal items and telephone within reach. Instruct patient to call for assistance. Room bathroom lighting operational. Non-slip footwear when patient is off stretcher. Physically safe environment: no spills, clutter or unnecessary equipment. Stretcher in lowest position, wheels locked, appropriate side rails in place.

## 2021-06-10 NOTE — ED PROVIDER NOTE - PROGRESS NOTE DETAILS
QUYNH Raza; All results reviewed with patient. Patient denies any new complaints. Xray reviewed by Dr. Pineda and myself - lungs appear clear. Will send Flonase, Loratadine to pharmacy and provide pulmonology referral list.

## 2021-08-30 ENCOUNTER — EMERGENCY (EMERGENCY)
Facility: HOSPITAL | Age: 21
LOS: 1 days | Discharge: HOME | End: 2021-08-30
Attending: EMERGENCY MEDICINE | Admitting: EMERGENCY MEDICINE
Payer: MEDICAID

## 2021-08-30 VITALS
HEART RATE: 87 BPM | TEMPERATURE: 99 F | RESPIRATION RATE: 16 BRPM | HEIGHT: 63 IN | OXYGEN SATURATION: 100 % | DIASTOLIC BLOOD PRESSURE: 61 MMHG | SYSTOLIC BLOOD PRESSURE: 110 MMHG

## 2021-08-30 VITALS
SYSTOLIC BLOOD PRESSURE: 109 MMHG | DIASTOLIC BLOOD PRESSURE: 59 MMHG | HEART RATE: 74 BPM | OXYGEN SATURATION: 100 % | RESPIRATION RATE: 15 BRPM

## 2021-08-30 LAB
ALBUMIN SERPL ELPH-MCNC: 3.9 G/DL — SIGNIFICANT CHANGE UP (ref 3.3–5)
ALP SERPL-CCNC: 52 U/L — SIGNIFICANT CHANGE UP (ref 40–120)
ALT FLD-CCNC: 13 U/L — SIGNIFICANT CHANGE UP (ref 4–33)
ANION GAP SERPL CALC-SCNC: 10 MMOL/L — SIGNIFICANT CHANGE UP (ref 7–14)
AST SERPL-CCNC: 18 U/L — SIGNIFICANT CHANGE UP (ref 4–32)
BASOPHILS # BLD AUTO: 0.03 K/UL — SIGNIFICANT CHANGE UP (ref 0–0.2)
BASOPHILS NFR BLD AUTO: 0.3 % — SIGNIFICANT CHANGE UP (ref 0–2)
BILIRUB SERPL-MCNC: 0.2 MG/DL — SIGNIFICANT CHANGE UP (ref 0.2–1.2)
BUN SERPL-MCNC: 11 MG/DL — SIGNIFICANT CHANGE UP (ref 7–23)
CALCIUM SERPL-MCNC: 9.1 MG/DL — SIGNIFICANT CHANGE UP (ref 8.4–10.5)
CHLORIDE SERPL-SCNC: 103 MMOL/L — SIGNIFICANT CHANGE UP (ref 98–107)
CO2 SERPL-SCNC: 22 MMOL/L — SIGNIFICANT CHANGE UP (ref 22–31)
CREAT SERPL-MCNC: 0.94 MG/DL — SIGNIFICANT CHANGE UP (ref 0.5–1.3)
D DIMER BLD IA.RAPID-MCNC: <150 NG/ML DDU — SIGNIFICANT CHANGE UP
EOSINOPHIL # BLD AUTO: 0.18 K/UL — SIGNIFICANT CHANGE UP (ref 0–0.5)
EOSINOPHIL NFR BLD AUTO: 2 % — SIGNIFICANT CHANGE UP (ref 0–6)
GLUCOSE SERPL-MCNC: 92 MG/DL — SIGNIFICANT CHANGE UP (ref 70–99)
HCT VFR BLD CALC: 39.1 % — SIGNIFICANT CHANGE UP (ref 34.5–45)
HGB BLD-MCNC: 12.5 G/DL — SIGNIFICANT CHANGE UP (ref 11.5–15.5)
IANC: 5.06 K/UL — SIGNIFICANT CHANGE UP (ref 1.5–8.5)
IMM GRANULOCYTES NFR BLD AUTO: 0.2 % — SIGNIFICANT CHANGE UP (ref 0–1.5)
LYMPHOCYTES # BLD AUTO: 2.89 K/UL — SIGNIFICANT CHANGE UP (ref 1–3.3)
LYMPHOCYTES # BLD AUTO: 32.5 % — SIGNIFICANT CHANGE UP (ref 13–44)
MCHC RBC-ENTMCNC: 26.6 PG — LOW (ref 27–34)
MCHC RBC-ENTMCNC: 32 GM/DL — SIGNIFICANT CHANGE UP (ref 32–36)
MCV RBC AUTO: 83.2 FL — SIGNIFICANT CHANGE UP (ref 80–100)
MONOCYTES # BLD AUTO: 0.71 K/UL — SIGNIFICANT CHANGE UP (ref 0–0.9)
MONOCYTES NFR BLD AUTO: 8 % — SIGNIFICANT CHANGE UP (ref 2–14)
NEUTROPHILS # BLD AUTO: 5.06 K/UL — SIGNIFICANT CHANGE UP (ref 1.8–7.4)
NEUTROPHILS NFR BLD AUTO: 57 % — SIGNIFICANT CHANGE UP (ref 43–77)
NRBC # BLD: 0 /100 WBCS — SIGNIFICANT CHANGE UP
NRBC # FLD: 0 K/UL — SIGNIFICANT CHANGE UP
PLATELET # BLD AUTO: 299 K/UL — SIGNIFICANT CHANGE UP (ref 150–400)
POTASSIUM SERPL-MCNC: 4.1 MMOL/L — SIGNIFICANT CHANGE UP (ref 3.5–5.3)
POTASSIUM SERPL-SCNC: 4.1 MMOL/L — SIGNIFICANT CHANGE UP (ref 3.5–5.3)
PROT SERPL-MCNC: 7.1 G/DL — SIGNIFICANT CHANGE UP (ref 6–8.3)
RBC # BLD: 4.7 M/UL — SIGNIFICANT CHANGE UP (ref 3.8–5.2)
RBC # FLD: 12.3 % — SIGNIFICANT CHANGE UP (ref 10.3–14.5)
SODIUM SERPL-SCNC: 135 MMOL/L — SIGNIFICANT CHANGE UP (ref 135–145)
WBC # BLD: 8.89 K/UL — SIGNIFICANT CHANGE UP (ref 3.8–10.5)
WBC # FLD AUTO: 8.89 K/UL — SIGNIFICANT CHANGE UP (ref 3.8–10.5)

## 2021-08-30 PROCEDURE — 99285 EMERGENCY DEPT VISIT HI MDM: CPT

## 2021-08-30 RX ORDER — METOCLOPRAMIDE HCL 10 MG
10 TABLET ORAL ONCE
Refills: 0 | Status: COMPLETED | OUTPATIENT
Start: 2021-08-30 | End: 2021-08-30

## 2021-08-30 RX ORDER — KETOROLAC TROMETHAMINE 30 MG/ML
15 SYRINGE (ML) INJECTION ONCE
Refills: 0 | Status: DISCONTINUED | OUTPATIENT
Start: 2021-08-30 | End: 2021-08-30

## 2021-08-30 RX ORDER — SODIUM CHLORIDE 9 MG/ML
1000 INJECTION, SOLUTION INTRAVENOUS ONCE
Refills: 0 | Status: COMPLETED | OUTPATIENT
Start: 2021-08-30 | End: 2021-08-30

## 2021-08-30 RX ADMIN — Medication 15 MILLIGRAM(S): at 22:40

## 2021-08-30 RX ADMIN — Medication 10 MILLIGRAM(S): at 22:40

## 2021-08-30 RX ADMIN — SODIUM CHLORIDE 1000 MILLILITER(S): 9 INJECTION, SOLUTION INTRAVENOUS at 22:40

## 2021-08-30 NOTE — ED PROVIDER NOTE - PHYSICAL EXAMINATION
GENERAL: Awake, alert, NAD  HEENT: NC/AT, moist mucous membranes,  LUNGS: CTAB, no wheezes or crackles   CARDIAC: RRR, no m/r/g  ABDOMEN: Soft, , non tender, non distended, no rebound, no guarding  EXT: No edema, no calf tenderness, 2+ DP pulses bilaterally, no deformities. -Brudzinski   NEURO: A&Ox3. Moving all extremities. CN 2-8,10-12 intact, no sensory deficits, strength 5+ throughout extremities   SKIN: Warm and dry. No rash.  PSYCH: Normal affect.

## 2021-08-30 NOTE — ED PROVIDER NOTE - PATIENT PORTAL LINK FT
You can access the FollowMyHealth Patient Portal offered by [facility] by registering at the following website: [email or web address]. By joining [employer]’s FollowMyHealth portal, you will also be able to view your health information using other applications (apps) compatible with our system.

## 2021-08-30 NOTE — ED ADULT NURSE NOTE - NSIMPLEMENTINTERV_GEN_ALL_ED
Implemented All Universal Safety Interventions:  [place] to call system. Call bell, personal items and telephone within reach. Instruct patient to call for assistance. Room bathroom lighting operational. Non-slip footwear when patient is off stretcher. Physically safe environment: no spills, clutter or unnecessary equipment. Stretcher in lowest position, wheels locked, appropriate side rails in place.

## 2021-08-30 NOTE — ED PROVIDER NOTE - OBJECTIVE STATEMENT
21F hx PE 17 CC headache. Started 26 august, 1 day after pfizer vaccine, says since then her head has been heavy, and she feels fatigue, no fevers, no photophobia, has taken motrin, has not helped. Heaviness present all day every day says she has been also feeling fatigued. Heaviness present over the back of her head. Also has been having non exertional dyspnea, says she sometimes feel like she has to take a deep breath to catch her breath.   -N/-V

## 2021-08-30 NOTE — ED ADULT NURSE NOTE - OBJECTIVE STATEMENT
alert oriented no acute distress c/o occipital headache  and neck pain 4/10  no c/o dizziness weakness or blurry vision   skin warm color fair

## 2021-08-30 NOTE — ED PROVIDER NOTE - ATTENDING CONTRIBUTION TO CARE
agree with resident note    "21F hx PE 17 CC headache. Started 26 august, 1 day after pfizer vaccine, says since then her head has been heavy, and she feels fatigue, no fevers, no photophobia, has taken motrin, has not helped. Heaviness present all day every day says she has been also feeling fatigued. Heaviness present over the back of her head. Also has been having non exertional dyspnea, says she sometimes feel like she has to take a deep breath to catch her breath.   -N/-V"    PE: well appearing; VSS; CTAB/L; s1 s2 no m/r/g abd soft/NT/ND ext: no edema    given hx of PE (due to OCPs) will get d-dimer and labs and EKG; if wnl can be discharged

## 2021-08-30 NOTE — ED PROVIDER NOTE - CLINICAL SUMMARY MEDICAL DECISION MAKING FREE TEXT BOX
21F PMH PE CC headache since august 26, DDX headache, possible inflammatory response from vaccine,, low suspicion for meningitis given -fever, no focal neurological abnormalities, Low suspicion for PE given absence of tachy, O2 100%  Plan Urine preg, reglain ketorolac, 1l fluids

## 2021-08-30 NOTE — ED ADULT TRIAGE NOTE - CHIEF COMPLAINT QUOTE
Pt presents to ED ambulatory from home with c/o headache x 1 week after receiving first dose of Pfizer vaccine. Pt endorses intermittent dizziness.

## 2021-08-30 NOTE — ED PROVIDER NOTE - PROGRESS NOTE DETAILS
Headache improved, pending labs to d/c Migue, PGY2: Patient reassessed, headache improved. Dimer negative. Will d/c with pcp f/u.

## 2021-08-31 LAB — SARS-COV-2 RNA SPEC QL NAA+PROBE: SIGNIFICANT CHANGE UP

## 2022-02-20 ENCOUNTER — EMERGENCY (EMERGENCY)
Facility: HOSPITAL | Age: 22
LOS: 1 days | Discharge: HOME | End: 2022-02-20
Attending: EMERGENCY MEDICINE | Admitting: EMERGENCY MEDICINE
Payer: MEDICAID

## 2022-02-20 VITALS
TEMPERATURE: 98 F | HEART RATE: 83 BPM | OXYGEN SATURATION: 100 % | SYSTOLIC BLOOD PRESSURE: 110 MMHG | DIASTOLIC BLOOD PRESSURE: 64 MMHG | HEIGHT: 63 IN | RESPIRATION RATE: 18 BRPM

## 2022-02-20 PROCEDURE — 99284 EMERGENCY DEPT VISIT MOD MDM: CPT

## 2022-02-20 PROCEDURE — 93970 EXTREMITY STUDY: CPT | Mod: 26

## 2022-02-20 NOTE — ED PROVIDER NOTE - PATIENT PORTAL LINK FT
You can access the FollowMyHealth Patient Portal offered by [facility] by registering at the following website: [email or web address]. By joining [employer]’s FollowMyHealth portal, you will also be able to view your health information using other applications (apps) compatible with our system.

## 2022-02-20 NOTE — ED PROVIDER NOTE - OBJECTIVE STATEMENT
22y F w/ H/O PE in 2016 2/2 OCP use, not currently on any anticoagulation, here w/ pain to b/l calves x1 wk. Pt denies any fall, trauma, or other known inciting event. Denies redness or swelling. Denies CP or SOB.

## 2022-02-20 NOTE — ED ADULT TRIAGE NOTE - CHIEF COMPLAINT QUOTE
Pt reporting to the ED for bilateral lower leg pain for ~ 1 week. no injury or trauma to area. PMH of PE, pt concerned for DVT. No chest pain or SOB. Not taking anticoagulation at this time. awaiting ekg.

## 2022-02-20 NOTE — ED PROVIDER NOTE - CLINICAL SUMMARY MEDICAL DECISION MAKING FREE TEXT BOX
22y F w/ H/O PE w/ b/l leg pain. Low nick for DVT, but given prior hx, will obtain venous duplex. Pt declines pain meds at this time, likely d/c w/ PCP f/u.

## 2022-02-20 NOTE — ED PROVIDER NOTE - EXTREMITY EXAM
No erythema or swelling on b/l legs. Distally neurovascularly intact. TTP at posterior aspect of b/l calves, no palpable cords.

## 2022-08-10 NOTE — ED ADULT NURSE NOTE - NS TRANSFER PATIENT BELONGINGS
Gastroenterology Consult  8/10/2022    Inpatient consult to GI  Consult performed by: Bernarda Alvares CNP  Consult ordered by: Jamie Alvares DO        Reason for Consultation: Jaundice, weakness, alcohol cirrhosis    Referring Physician: Dr. Alvares    History Of Present Illness:  Danielle is a 47 year old female with history of CLL (10 years in remission) alcohol abuse, liver disease and recent diagnosis of alcohol cirrhosis with alcohol hepatitis treated at [facility] who presented to the emergency room with weakness, jaundice, lower extremity edema and abdominal distention.  She denies symptoms as I am asking questions however nursing staff is present at the bedside and states that she does have abdominal pain, nausea and vomiting.  To me, she is denying any abdominal pain or distention.  She denies any nausea or vomiting.  Per nursing report, she did have 1 episode of nonbloody emesis last night.  She denies any fevers or chills.  She states that she has multiple bowel movements per day but is unable to quantify how many bowel movements she has.  She does state that she has loose stools.  She denies any hematochezia or melena.  She denies any unintentional weight loss.  She denies any confusion, fogginess.  She did used to drink alcohol but is unable to tell me when she quit drinking.  She states that she would drink 1 to 2 glasses of wine daily after working nights.  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  She was recently discharged and transferred to rehab facility which she signed out of.  WBC on arrival was 14.6, hemoglobin 9.8, platelets 65.  Her sodium level is low at 128, MCV elevated at 110.  BUN 18, creatinine 0.41.  AST 84, ALT 64, alk phos 193, bilirubin 10.9.  Her lipase was normal at 272.  Ammonia level less than 10.  Her INR was elevated at 2.1.  CT of the abdomen pelvis reveals portal hypertension, cirrhotic liver, severe ascites and gastroesophageal varices with mild  splenomegaly.  GI been consulted for further evaluation and management.  Per chart review, she has her medications listed as Aldactone 100 mg daily, prednisone 20 mg daily, lactulose, Lasix 40 mg daily and Bactrim on  and .  When asked why taking Bactrim, she states to prevent infection.  She is on midodrine for esophageal varices.    Sydni WELLS was present during my visit.    Patient signed authorization for release of information from [facility].  She was recently seen at [facility] with elevated LFTs.  CT at that time showed enlarged severely fatty liver, mildly enlarged spleen, moderate ascites, thickened colon.  Hepatitis panel, HIV negative.  Elevated IgA suggestive of steatohepatitis.  Path elevated, AMA negative.  Per chart review in care everywhere from [facility]- patient was admitted on 2022 with acute decompensated EtOH cirrhosis complicated by ascites and hepatic encephalopathy was advised to do prednisone taper starting at 40 mg daily x28 days and taper by 10 mg after completing 1 month.  She was advised to continue Bactrim  for PJP prophylaxis, Lasix and Aldactone for diuresis.  She was advised to take lactulose every 6 hours with MiraLAX daily for goal of 3-5 bowel movements daily.  She also had HFE gene mutation pending.  She needed EGD outpatient. She did have a paracentesis on 8/3/2022 with 3.6 L removed.    Past Medical History  Past Medical History:   Diagnosis Date   • Anemia    • Liver disease    Alcohol abuse  COVID 2022    Surgical History  Past Surgical History:   Procedure Laterality Date   •  section, low transverse     No prior EGD or colonoscopy  IR transjugular liver biopsy 2022     Social History  Social History     Tobacco Use   • Smoking status: Former Smoker     Packs/day: 0.50     Types: Cigarettes   • Smokeless tobacco: Never Used   Vaping Use   •  Vaping Use: never used   Substance Use Topics   • Alcohol use: Not Currently   • Drug use: Never   Patient unable to remember last drink  States 1 glass of wine daily  Former smoker  Denies illicit drugs    Family History  No family history on file.  Denies family history of GI malignancies or liver disease    Allergies  ALLERGIES:   Allergen Reactions   • Penicillins HIVES   • Sulfa Antibiotics HIVES       Medications  Current Facility-Administered Medications   Medication Dose Route Frequency Provider Last Rate Last Admin   • melatonin tablet 6 mg  6 mg Oral Nightly PRN Romel Sandoval MD   6 mg at 08/10/22 0130   • pantoprazole (PROTONIX INJECT) injection 40 mg  40 mg Intravenous QAM AC Romel Sandoval MD   40 mg at 08/10/22 1025   • ondansetron (ZOFRAN) injection 4 mg  4 mg Intravenous Q6H PRN Romel Sandoval MD   4 mg at 08/10/22 0655   • folic acid (FOLATE) tablet 1 mg  1 mg Oral Daily Romel Sandoval MD   1 mg at 08/10/22 1025   • gabapentin (NEURONTIN) capsule 300 mg  300 mg Oral TID Romel Sandoval MD   300 mg at 08/10/22 1623   • lactulose (CHRONULAC) 10 GM/15ML solution 20 g  20 g Oral TID Romel Sandoval MD   20 g at 08/10/22 1623   • midodrine (PROAMATINE) tablet 5 mg  5 mg Oral TID AC Romel Sandoval MD   5 mg at 08/10/22 1623   • predniSONE (DELTASONE) tablet 20 mg  20 mg Oral Daily Romel Sandoval MD   20 mg at 08/10/22 1025   • traMADol (ULTRAM) tablet 50 mg  50 mg Oral Q6H PRN Romel Sandoval MD   50 mg at 08/10/22 1303       Review of Systems   Constitutional: Denies weight loss, positive for fatigue, positive for fever  Eyes: Denies vision change, denies icterus  Ears, nose, mouth: Denies hearing loss, oral pain  Cardiac: Denies chest pain, palpitations  Respiratory: Denies productive cough or dyspnea  Gastrointestinal: Per HPI  Genitourinary: Denies dysuria, increased frequency, hematuria  Hematology: Denies night sweats, easy bruising, lymphadenopathy  Musculoskeletal: Denies joint pain, erythema,  swelling  Neurologic: Denies numbness, tingling, seizures  Psychiatric: Positive for depression, anxiety  Endocrine: Denies urinary frequency, temperature intolerance    Last Recorded Vitals  Vitals with min/max:    Vital Last Value 24 Hour Range   Temperature 98.4 °F (36.9 °C) (08/10/22 1438) Temp  Min: 97.7 °F (36.5 °C)  Max: 99.1 °F (37.3 °C)   Pulse (!) 121 (08/10/22 1438) Pulse  Min: 109  Max: 126   Respiratory 20 (08/10/22 1438) Resp  Min: 12  Max: 20   Non-Invasive  Blood Pressure 110/73 (08/10/22 1438) BP  Min: 95/66  Max: 123/73   Pulse Oximetry 97 % (08/10/22 1438) SpO2  Min: 94 %  Max: 100 %   Arterial   Blood Pressure   No data recorded     Physical Exam   General:  Alert, oriented, no distress.  Skin: Markedly jaundiced, warm and dry without rash  Head:  Normocephalic, atraumatic.   Neck:  Trachea is midline. Supple.  Eyes:  Normal conjunctivae and icterus to sclerae.    Cardiovascular:  Regular, rate and rhythm.  Respiratory:  Normal respiratory effort. No cough or wheezes  Gastrointestinal: Rounded, distended, tense, positive fluid wave normal bowel sounds.   Musculoskeletal: Pitting edema 1+ bilateral lower extremities  No tenderness to palpation.  Neurologic:   Oriented x 3.  Slow to respond  Psychiatric:   Flat affect, delayed response    Labs  Recent Results (from the past 24 hour(s))   ECG    Collection Time: 08/09/22  5:17 PM   Result Value Ref Range    Ventricular Rate EKG/Min (BPM) 114     MI-Interval (MSEC) 133     QRS-Interval (MSEC) 76     QT-Interval (MSEC) 318     QTc 386     P Axis (Degrees) 62     R Axis (Degrees) 57     T Axis (Degrees) 67     REPORT TEXT       SINUS TACHYCARDIA  POSSIBLE LEFT ATRIAL ENLARGEMENT  LOW QRS VOLTAGE IN PRECORDIAL LEADS  ABNORMAL RHYTHM ECG  UNCONFIRMED REPORT  Confirmed by RUI PICKETT M.D. ([id number]) on 8/10/2022 2:12:29 PM     Comprehensive Metabolic Panel    Collection Time: 08/09/22  5:45 PM   Result Value Ref Range    Fasting Status      Sodium 128 (L)  135 - 145 mmol/L    Potassium 4.4 3.4 - 5.1 mmol/L    Chloride 94 (L) 97 - 110 mmol/L    Carbon Dioxide 25 21 - 32 mmol/L    Anion Gap 13 7 - 19 mmol/L    Glucose 112 (H) 70 - 99 mg/dL    BUN 18 6 - 20 mg/dL    Creatinine 0.41 (L) 0.51 - 0.95 mg/dL    Glomerular Filtration Rate >90 >=60    BUN/ Creatinine Ratio 44 (H) 7 - 25    Calcium 7.9 (L) 8.4 - 10.2 mg/dL    Bilirubin, Total 10.9 (H) 0.2 - 1.0 mg/dL    GOT/AST 84 (H) <=37 Units/L    GPT/ALT 64 (H) <64 Units/L    Alkaline Phosphatase 193 (H) 45 - 117 Units/L    Albumin 1.8 (L) 3.6 - 5.1 g/dL    Protein, Total 5.7 (L) 6.4 - 8.2 g/dL    Globulin 3.9 2.0 - 4.0 g/dL    A/G Ratio 0.5 (L) 1.0 - 2.4   Lipase    Collection Time: 08/09/22  5:45 PM   Result Value Ref Range    Lipase 272 73 - 393 Units/L   Light Blue Top    Collection Time: 08/09/22  5:45 PM   Result Value Ref Range    Extra Tube Hold for Add Ons    Gold Top    Collection Time: 08/09/22  5:45 PM   Result Value Ref Range    Extra Tube Hold for Add Ons    CBC with Automated Differential (performable only)    Collection Time: 08/09/22  5:45 PM   Result Value Ref Range    WBC 14.6 (H) 4.2 - 11.0 K/mcL    RBC 2.50 (L) 4.00 - 5.20 mil/mcL    HGB 9.8 (L) 12.0 - 15.5 g/dL    HCT 27.5 (L) 36.0 - 46.5 %    .0 (H) 78.0 - 100.0 fl    MCH 39.2 (H) 26.0 - 34.0 pg    MCHC 35.6 32.0 - 36.5 g/dL    RDW-CV 13.8 11.0 - 15.0 %    RDW-SD 56.2 (H) 39.0 - 50.0 fL    PLT 65 (L) 140 - 450 K/mcL    NRBC 0 <=0 /100 WBC    Neutrophil, Percent 93 %    Lymphocytes, Percent 3 %    Mono, Percent 3 %    Eosinophils, Percent 0 %    Basophils, Percent 0 %    Immature Granulocytes 1 %    Absolute Neutrophils 13.5 (H) 1.8 - 7.7 K/mcL    Absolute Lymphocytes 0.5 (L) 1.0 - 4.8 K/mcL    Absolute Monocytes 0.5 0.3 - 0.9 K/mcL    Absolute Eosinophils  0.0 0.0 - 0.5 K/mcL    Absolute Basophils 0.0 0.0 - 0.3 K/mcL    Absolute Immmature Granulocytes 0.1 0.0 - 0.2 K/mcL   TROPONIN I, HIGH SENSITIVITY    Collection Time: 08/09/22  5:45 PM    Result Value Ref Range    Troponin I, High Sensitivity 5 <52 ng/L   Ammonia    Collection Time: 08/09/22  5:51 PM   Result Value Ref Range    Ammonia <10 <=33 mcmol/L   Grey Top Tube    Collection Time: 08/09/22  5:51 PM   Result Value Ref Range    Extra Tube Hold for Add Ons    Urinalysis & Reflex Microscopy With Culture If Indicated    Collection Time: 08/09/22  6:05 PM   Result Value Ref Range    COLOR, URINALYSIS Yellow     APPEARANCE, URINALYSIS Clear     GLUCOSE, URINALYSIS 100  (A) Negative mg/dL    BILIRUBIN, URINALYSIS Positive (A) Negative    KETONES, URINALYSIS Trace (A) Negative mg/dL    SPECIFIC GRAVITY, URINALYSIS 1.025 1.005 - 1.030    OCCULT BLOOD, URINALYSIS Trace (A) Negative    PH, URINALYSIS 5.5 5.0 - 7.0    PROTEIN, URINALYSIS Trace (A) Negative mg/dL    UROBILINOGEN, URINALYSIS 4.0 (A) 0.2, 1.0 mg/dL    NITRITE, URINALYSIS Negative Negative    LEUKOCYTE ESTERASE, URINALYSIS Trace (A) Negative    SQUAMOUS EPITHELIAL, URINALYSIS 6 to 10 (A) None Seen, 1 to 5 /hpf    ERYTHROCYTES, URINALYSIS 1 to 2 None Seen, 1 to 2 /hpf    LEUKOCYTES, URINALYSIS 1 to 5 None Seen, 1 to 5 /hpf    BACTERIA, URINALYSIS Large (A) None Seen /hpf    HYALINE CASTS, URINALYSIS None Seen None Seen, 1 to 5 /lpf   Prothrombin Time    Collection Time: 08/09/22  8:27 PM   Result Value Ref Range    Prothrombin Time 20.2 (H) 9.7 - 11.8 sec    INR 2.1     Partial Thromboplastin Time    Collection Time: 08/09/22  8:27 PM   Result Value Ref Range    PTT 33 (H) 22 - 30 sec   Rapid SARS-CoV-2 by PCR    Collection Time: 08/09/22  8:30 PM    Specimen: Nasal, Mid-turbinate; Swab   Result Value Ref Range    Rapid SARS-COV-2 by PCR Not Detected Not Detected / Detected / Presumptive Positive / Inhibitors present    Isolation Guidelines      Procedural Comment       Lab Results   Component Value Date    HGB 9.8 (L) 08/09/2022    HCT 27.5 (L) 08/09/2022    AST 84 (H) 08/09/2022    LIPA 272 08/09/2022    BILIRUBIN 10.9 (H) 08/09/2022     ALKPT 193 (H) 08/09/2022    NH3 <10 08/09/2022    INR 2.1 08/09/2022       Imaging  CT ABDOMEN PELVIS W CONTRAST - IV contrast only   Final Result   1.    There is evidence of portal hypertension with severe ascites   presumably related to cirrhosis or hepatic venoocclusive disease.    2.    I suspect mild gastroesophageal varices.    3.    Borderline splenomegaly       Electronically Signed by: KWABENA MELENDEZ MD    Signed on: 8/9/2022 8:41 PM              Assessment  1. Decompensated alcohol cirrhosis complicated by ascites, portal hypertension, gastric and esophageal varices, lower extremity edema, hepatic encephalopathy  2. Acute liver failure  3. Alcohol hepatitis  4. Alcohol abuse  5. Pancytopenia  6. Leukocytosis-could be due to steroids however to rule out infectious etiology  7. Ascites with concerns for SBP  8. Hyponatremia    47-year-old female with history of decompensated alcohol cirrhosis complicated by ascites, portal hypertension, gastric and esophageal varices, lower extremity edema presenting with weakness.  Patient recently diagnosed with alcohol cirrhosis.  She states she had a liver biopsy at [facility].  Records are unavailable for review at this time.  We will have patient sign Care Everywhere forms to access records from [facility].  Patient with component of alcohol hepatitis currently on prednisone 20 mg daily.  Patient also on Aldactone 100 mg and Lasix 40 mg daily but unsure if patient has been compliant with taking these at home.  She has a history of alcohol abuse, unable to quantify how much alcohol and when last drink was.  She remains tachycardic, leukocytosis is present, WBC 14.6.  Patient also with hyponatremia and 128.  She does have anemia hemoglobin 9.8  likely from bone marrow suppression and cirrhosis.  There is no overt GI bleeding.  Abdomen is distended with positive fluid wave, slightly tense.  CT with findings of severe ascites.  We will  consult interventional radiology for paracentesis and cell count, culture, etc.  Continue diuretics.  Patient also with elevated INR 2.1, bilirubin 10.9.  Her ammonia is less than 10 however she is slow to respond to questions.  Further recommendation [facility] lab trend and clinical course.  We will follow    PLAN  1. Okay for clear liquids  2. Continue Aldactone and Lasix  3. IR consultation for paracentesis  4. Continue antibiotics  5. Continue prednisone 40 mg daily  6. Continue PPI  7. Trend labs  8. Alcohol cessation  9. Will follow  10. Discussed with Dr. Brody    Thank you for allowing us to participate in the patients care. Please do not hesitate to call us with any questions or concerns.    Bernarda Alvares CNP    [facility]   [phone]  8/10/2022             Clothing

## 2023-01-06 NOTE — ED ADULT NURSE NOTE - CHIEF COMPLAINT QUOTE
Pt states yesterday she fell backwards hitting the back of her head  . Pt denies LOC reports bump to back of head and pain. Pt denies any nausea or vomiting with headache. labs, urine non-actionable  CXR normal  US normal  Consistent w/ Adenovirus infection  Yohannes Alberto PA-C reviewed case w/ Dr. Arana  Sounds compatible w/ Adenovirus  Patient well appearing, non-toxic  Attempted to add on parvovirus titers  Will DC w/ PMD F/U, ID F/U, and strict ER return precautions  Patient is stable, in no apparent distress, non-toxic appearing, tolerating PO, no neurologic deficits, and is cleared for discharge to home. Yohannes Alberto PA-C

## 2023-01-31 ENCOUNTER — EMERGENCY (EMERGENCY)
Facility: HOSPITAL | Age: 23
LOS: 1 days | Discharge: HOME | End: 2023-01-31
Admitting: EMERGENCY MEDICINE
Payer: MEDICAID

## 2023-01-31 VITALS
OXYGEN SATURATION: 100 % | TEMPERATURE: 98 F | RESPIRATION RATE: 16 BRPM | HEART RATE: 88 BPM | SYSTOLIC BLOOD PRESSURE: 116 MMHG | DIASTOLIC BLOOD PRESSURE: 71 MMHG

## 2023-01-31 PROCEDURE — 99284 EMERGENCY DEPT VISIT MOD MDM: CPT

## 2023-01-31 NOTE — ED ADULT NURSE NOTE - OBJECTIVE STATEMENT
Pt is A&O x 4, ambulatory w/o assist, shows no signs of acute distress. Brought into the ED w/ lower abdominal discomfort, associated with vaginal spotting. Pt is currently 16 weeks pregnant, . Denies SOB or chest discomfort. Respirations even and unlabored. Right AC 20 gauge placed and labs drawn. Pending results and US.

## 2023-01-31 NOTE — ED ADULT TRIAGE NOTE - CHIEF COMPLAINT QUOTE
Patient c/o abdominal cramping. Pt. approx 16 weeks pregnant, . Endorses vaginal spotting and cramping. No CP/SOB. No PMH.

## 2023-02-01 LAB
ALBUMIN SERPL ELPH-MCNC: 4 G/DL — SIGNIFICANT CHANGE UP (ref 3.3–5)
ALP SERPL-CCNC: 39 U/L — LOW (ref 40–120)
ALT FLD-CCNC: 23 U/L — SIGNIFICANT CHANGE UP (ref 4–33)
ANION GAP SERPL CALC-SCNC: 11 MMOL/L — SIGNIFICANT CHANGE UP (ref 7–14)
APPEARANCE UR: CLEAR — SIGNIFICANT CHANGE UP
AST SERPL-CCNC: 19 U/L — SIGNIFICANT CHANGE UP (ref 4–32)
BACTERIA # UR AUTO: ABNORMAL
BASOPHILS # BLD AUTO: 0.03 K/UL — SIGNIFICANT CHANGE UP (ref 0–0.2)
BASOPHILS NFR BLD AUTO: 0.3 % — SIGNIFICANT CHANGE UP (ref 0–2)
BILIRUB SERPL-MCNC: <0.2 MG/DL — SIGNIFICANT CHANGE UP (ref 0.2–1.2)
BILIRUB UR-MCNC: NEGATIVE — SIGNIFICANT CHANGE UP
BLD GP AB SCN SERPL QL: NEGATIVE — SIGNIFICANT CHANGE UP
BUN SERPL-MCNC: 11 MG/DL — SIGNIFICANT CHANGE UP (ref 7–23)
CALCIUM SERPL-MCNC: 9.4 MG/DL — SIGNIFICANT CHANGE UP (ref 8.4–10.5)
CHLORIDE SERPL-SCNC: 101 MMOL/L — SIGNIFICANT CHANGE UP (ref 98–107)
CO2 SERPL-SCNC: 23 MMOL/L — SIGNIFICANT CHANGE UP (ref 22–31)
COLOR SPEC: YELLOW — SIGNIFICANT CHANGE UP
COMMENT - URINE: SIGNIFICANT CHANGE UP
CREAT SERPL-MCNC: 0.74 MG/DL — SIGNIFICANT CHANGE UP (ref 0.5–1.3)
DIFF PNL FLD: ABNORMAL
EGFR: 115 ML/MIN/1.73M2 — SIGNIFICANT CHANGE UP
EOSINOPHIL # BLD AUTO: 0.11 K/UL — SIGNIFICANT CHANGE UP (ref 0–0.5)
EOSINOPHIL NFR BLD AUTO: 1 % — SIGNIFICANT CHANGE UP (ref 0–6)
EPI CELLS # UR: 3 /HPF — SIGNIFICANT CHANGE UP (ref 0–5)
GLUCOSE SERPL-MCNC: 84 MG/DL — SIGNIFICANT CHANGE UP (ref 70–99)
GLUCOSE UR QL: NEGATIVE — SIGNIFICANT CHANGE UP
HCT VFR BLD CALC: 31.9 % — LOW (ref 34.5–45)
HGB BLD-MCNC: 10.3 G/DL — LOW (ref 11.5–15.5)
HYALINE CASTS # UR AUTO: 0 /LPF — SIGNIFICANT CHANGE UP (ref 0–7)
IANC: 7.14 K/UL — SIGNIFICANT CHANGE UP (ref 1.8–7.4)
IMM GRANULOCYTES NFR BLD AUTO: 1 % — HIGH (ref 0–0.9)
KETONES UR-MCNC: ABNORMAL
LEUKOCYTE ESTERASE UR-ACNC: NEGATIVE — SIGNIFICANT CHANGE UP
LYMPHOCYTES # BLD AUTO: 2.63 K/UL — SIGNIFICANT CHANGE UP (ref 1–3.3)
LYMPHOCYTES # BLD AUTO: 23.8 % — SIGNIFICANT CHANGE UP (ref 13–44)
MCHC RBC-ENTMCNC: 27.6 PG — SIGNIFICANT CHANGE UP (ref 27–34)
MCHC RBC-ENTMCNC: 32.3 GM/DL — SIGNIFICANT CHANGE UP (ref 32–36)
MCV RBC AUTO: 85.5 FL — SIGNIFICANT CHANGE UP (ref 80–100)
MONOCYTES # BLD AUTO: 1.02 K/UL — HIGH (ref 0–0.9)
MONOCYTES NFR BLD AUTO: 9.2 % — SIGNIFICANT CHANGE UP (ref 2–14)
NEUTROPHILS # BLD AUTO: 7.14 K/UL — SIGNIFICANT CHANGE UP (ref 1.8–7.4)
NEUTROPHILS NFR BLD AUTO: 64.7 % — SIGNIFICANT CHANGE UP (ref 43–77)
NITRITE UR-MCNC: NEGATIVE — SIGNIFICANT CHANGE UP
NRBC # BLD: 0 /100 WBCS — SIGNIFICANT CHANGE UP (ref 0–0)
NRBC # FLD: 0 K/UL — SIGNIFICANT CHANGE UP (ref 0–0)
PH UR: 6.5 — SIGNIFICANT CHANGE UP (ref 5–8)
PLATELET # BLD AUTO: 248 K/UL — SIGNIFICANT CHANGE UP (ref 150–400)
POTASSIUM SERPL-MCNC: 3.8 MMOL/L — SIGNIFICANT CHANGE UP (ref 3.5–5.3)
POTASSIUM SERPL-SCNC: 3.8 MMOL/L — SIGNIFICANT CHANGE UP (ref 3.5–5.3)
PROT SERPL-MCNC: 7.1 G/DL — SIGNIFICANT CHANGE UP (ref 6–8.3)
PROT UR-MCNC: ABNORMAL
RBC # BLD: 3.73 M/UL — LOW (ref 3.8–5.2)
RBC # FLD: 13.3 % — SIGNIFICANT CHANGE UP (ref 10.3–14.5)
RBC CASTS # UR COMP ASSIST: 2 /HPF — SIGNIFICANT CHANGE UP (ref 0–4)
RH IG SCN BLD-IMP: POSITIVE — SIGNIFICANT CHANGE UP
SODIUM SERPL-SCNC: 135 MMOL/L — SIGNIFICANT CHANGE UP (ref 135–145)
SP GR SPEC: 1.03 — SIGNIFICANT CHANGE UP (ref 1.01–1.05)
UROBILINOGEN FLD QL: SIGNIFICANT CHANGE UP
WBC # BLD: 11.04 K/UL — HIGH (ref 3.8–10.5)
WBC # FLD AUTO: 11.04 K/UL — HIGH (ref 3.8–10.5)
WBC UR QL: 5 /HPF — SIGNIFICANT CHANGE UP (ref 0–5)

## 2023-02-01 PROCEDURE — 76805 OB US >/= 14 WKS SNGL FETUS: CPT | Mod: 26

## 2023-02-01 NOTE — ED PROVIDER NOTE - CLINICAL SUMMARY MEDICAL DECISION MAKING FREE TEXT BOX
23-year-old female h/o PE on ASA16 weeks gestation presents with abdominal cramping x today.  Patient reports episode of vaginal spotting that she noticed when wiping.  No bleeding at current.  Patient also reporting left buttocks pain that radiates down left leg.  Patient denies any bladder or bowel incontinence no saddle anesthesia.  Patient denies any nausea vomiting diarrhea.  No chest pain or shortness of breath.  plan  - labs  - ua/cx  - OB US  - reassess

## 2023-02-01 NOTE — ED PROVIDER NOTE - OBJECTIVE STATEMENT
23-year-old female h/o PE on ASA16 weeks gestation presents with abdominal cramping x today.  Patient reports episode of vaginal spotting that she noticed when wiping.  No bleeding at current.  Patient also reporting left buttocks pain that radiates down left leg.  Patient denies any bladder or bowel incontinence no saddle anesthesia.  Patient denies any nausea vomiting diarrhea.  No chest pain or shortness of breath.

## 2023-02-01 NOTE — ED PROVIDER NOTE - PATIENT PORTAL LINK FT
You can access the FollowMyHealth Patient Portal offered by [facility] by registering at the following website: [email or web address]. By joining [employer]’s FollowMyHealth portal, you will also be able to view your health information using other applications (apps) compatible with our system.

## 2023-02-01 NOTE — ED PROVIDER NOTE - NSFOLLOWUPINSTRUCTIONS_ED_ALL_ED_FT
Follow up with your OBGYN     Return to the ER for worsening or persistent symptoms, including but not limited to worsening/persistent pain, shortness of breath, fevers, vomiting, lightheadedness, passing out and/or ANY NEW OR CONCERNING SYMPTOMS. If you have issues obtaining follow up, please call: [phone] to obtain a doctor or specialist who takes your insurance in your area.  You may call [phone] to make an appointment with the internal medicine clinic.

## 2023-02-02 LAB
CULTURE RESULTS: SIGNIFICANT CHANGE UP
SPECIMEN SOURCE: SIGNIFICANT CHANGE UP

## 2023-03-22 ENCOUNTER — EMERGENCY (EMERGENCY)
Facility: HOSPITAL | Age: 23
LOS: 1 days | Discharge: HOME | End: 2023-03-22
Attending: STUDENT IN AN ORGANIZED HEALTH CARE EDUCATION/TRAINING PROGRAM | Admitting: STUDENT IN AN ORGANIZED HEALTH CARE EDUCATION/TRAINING PROGRAM
Payer: MEDICAID

## 2023-03-22 VITALS
HEART RATE: 103 BPM | TEMPERATURE: 99 F | RESPIRATION RATE: 16 BRPM | SYSTOLIC BLOOD PRESSURE: 118 MMHG | DIASTOLIC BLOOD PRESSURE: 73 MMHG | OXYGEN SATURATION: 99 %

## 2023-03-22 VITALS
RESPIRATION RATE: 16 BRPM | SYSTOLIC BLOOD PRESSURE: 101 MMHG | TEMPERATURE: 99 F | HEART RATE: 90 BPM | OXYGEN SATURATION: 100 % | DIASTOLIC BLOOD PRESSURE: 66 MMHG

## 2023-03-22 LAB
ALBUMIN SERPL ELPH-MCNC: 3.7 G/DL — SIGNIFICANT CHANGE UP (ref 3.3–5)
ALP SERPL-CCNC: 51 U/L — SIGNIFICANT CHANGE UP (ref 40–120)
ALT FLD-CCNC: 23 U/L — SIGNIFICANT CHANGE UP (ref 4–33)
ANION GAP SERPL CALC-SCNC: 9 MMOL/L — SIGNIFICANT CHANGE UP (ref 7–14)
APPEARANCE UR: CLEAR — SIGNIFICANT CHANGE UP
APTT BLD: 26.6 SEC — LOW (ref 27–36.3)
AST SERPL-CCNC: 23 U/L — SIGNIFICANT CHANGE UP (ref 4–32)
BASOPHILS # BLD AUTO: 0.03 K/UL — SIGNIFICANT CHANGE UP (ref 0–0.2)
BASOPHILS NFR BLD AUTO: 0.2 % — SIGNIFICANT CHANGE UP (ref 0–2)
BILIRUB SERPL-MCNC: <0.2 MG/DL — SIGNIFICANT CHANGE UP (ref 0.2–1.2)
BILIRUB UR-MCNC: NEGATIVE — SIGNIFICANT CHANGE UP
BUN SERPL-MCNC: 7 MG/DL — SIGNIFICANT CHANGE UP (ref 7–23)
CALCIUM SERPL-MCNC: 9.3 MG/DL — SIGNIFICANT CHANGE UP (ref 8.4–10.5)
CHLORIDE SERPL-SCNC: 104 MMOL/L — SIGNIFICANT CHANGE UP (ref 98–107)
CO2 SERPL-SCNC: 23 MMOL/L — SIGNIFICANT CHANGE UP (ref 22–31)
COLOR SPEC: COLORLESS — SIGNIFICANT CHANGE UP
CREAT SERPL-MCNC: 0.65 MG/DL — SIGNIFICANT CHANGE UP (ref 0.5–1.3)
DIFF PNL FLD: NEGATIVE — SIGNIFICANT CHANGE UP
EGFR: 125 ML/MIN/1.73M2 — SIGNIFICANT CHANGE UP
EOSINOPHIL # BLD AUTO: 0.08 K/UL — SIGNIFICANT CHANGE UP (ref 0–0.5)
EOSINOPHIL NFR BLD AUTO: 0.5 % — SIGNIFICANT CHANGE UP (ref 0–6)
FLUAV AG NPH QL: SIGNIFICANT CHANGE UP
FLUBV AG NPH QL: SIGNIFICANT CHANGE UP
GLUCOSE SERPL-MCNC: 77 MG/DL — SIGNIFICANT CHANGE UP (ref 70–99)
GLUCOSE UR QL: NEGATIVE — SIGNIFICANT CHANGE UP
HCT VFR BLD CALC: 34.4 % — LOW (ref 34.5–45)
HGB BLD-MCNC: 11.1 G/DL — LOW (ref 11.5–15.5)
IANC: 11.97 K/UL — HIGH (ref 1.8–7.4)
IMM GRANULOCYTES NFR BLD AUTO: 2.2 % — HIGH (ref 0–0.9)
INR BLD: 0.94 RATIO — SIGNIFICANT CHANGE UP (ref 0.88–1.16)
KETONES UR-MCNC: NEGATIVE — SIGNIFICANT CHANGE UP
LEUKOCYTE ESTERASE UR-ACNC: NEGATIVE — SIGNIFICANT CHANGE UP
LYMPHOCYTES # BLD AUTO: 1.8 K/UL — SIGNIFICANT CHANGE UP (ref 1–3.3)
LYMPHOCYTES # BLD AUTO: 11.5 % — LOW (ref 13–44)
MCHC RBC-ENTMCNC: 27.8 PG — SIGNIFICANT CHANGE UP (ref 27–34)
MCHC RBC-ENTMCNC: 32.3 GM/DL — SIGNIFICANT CHANGE UP (ref 32–36)
MCV RBC AUTO: 86.2 FL — SIGNIFICANT CHANGE UP (ref 80–100)
MONOCYTES # BLD AUTO: 1.42 K/UL — HIGH (ref 0–0.9)
MONOCYTES NFR BLD AUTO: 9.1 % — SIGNIFICANT CHANGE UP (ref 2–14)
NEUTROPHILS # BLD AUTO: 11.97 K/UL — HIGH (ref 1.8–7.4)
NEUTROPHILS NFR BLD AUTO: 76.5 % — SIGNIFICANT CHANGE UP (ref 43–77)
NITRITE UR-MCNC: NEGATIVE — SIGNIFICANT CHANGE UP
NRBC # BLD: 0 /100 WBCS — SIGNIFICANT CHANGE UP (ref 0–0)
NRBC # FLD: 0 K/UL — SIGNIFICANT CHANGE UP (ref 0–0)
PH UR: 7.5 — SIGNIFICANT CHANGE UP (ref 5–8)
PLATELET # BLD AUTO: 224 K/UL — SIGNIFICANT CHANGE UP (ref 150–400)
POTASSIUM SERPL-MCNC: 4.2 MMOL/L — SIGNIFICANT CHANGE UP (ref 3.5–5.3)
POTASSIUM SERPL-SCNC: 4.2 MMOL/L — SIGNIFICANT CHANGE UP (ref 3.5–5.3)
PROT SERPL-MCNC: 6.9 G/DL — SIGNIFICANT CHANGE UP (ref 6–8.3)
PROT UR-MCNC: NEGATIVE — SIGNIFICANT CHANGE UP
PROTHROM AB SERPL-ACNC: 10.9 SEC — SIGNIFICANT CHANGE UP (ref 10.5–13.4)
RBC # BLD: 3.99 M/UL — SIGNIFICANT CHANGE UP (ref 3.8–5.2)
RBC # FLD: 13.2 % — SIGNIFICANT CHANGE UP (ref 10.3–14.5)
RSV RNA NPH QL NAA+NON-PROBE: SIGNIFICANT CHANGE UP
SARS-COV-2 RNA SPEC QL NAA+PROBE: SIGNIFICANT CHANGE UP
SODIUM SERPL-SCNC: 136 MMOL/L — SIGNIFICANT CHANGE UP (ref 135–145)
SP GR SPEC: 1.01 — LOW (ref 1.01–1.05)
UROBILINOGEN FLD QL: SIGNIFICANT CHANGE UP
WBC # BLD: 15.65 K/UL — HIGH (ref 3.8–10.5)
WBC # FLD AUTO: 15.65 K/UL — HIGH (ref 3.8–10.5)

## 2023-03-22 PROCEDURE — 99285 EMERGENCY DEPT VISIT HI MDM: CPT

## 2023-03-22 PROCEDURE — 71275 CT ANGIOGRAPHY CHEST: CPT | Mod: 26,MA

## 2023-03-22 RX ORDER — SODIUM CHLORIDE 9 MG/ML
1000 INJECTION INTRAMUSCULAR; INTRAVENOUS; SUBCUTANEOUS ONCE
Refills: 0 | Status: COMPLETED | OUTPATIENT
Start: 2023-03-22 | End: 2023-03-22

## 2023-03-22 RX ORDER — ACETAMINOPHEN 500 MG
975 TABLET ORAL ONCE
Refills: 0 | Status: COMPLETED | OUTPATIENT
Start: 2023-03-22 | End: 2023-03-22

## 2023-03-22 RX ORDER — METOCLOPRAMIDE HCL 10 MG
10 TABLET ORAL ONCE
Refills: 0 | Status: COMPLETED | OUTPATIENT
Start: 2023-03-22 | End: 2023-03-22

## 2023-03-22 RX ADMIN — Medication 975 MILLIGRAM(S): at 21:30

## 2023-03-22 RX ADMIN — SODIUM CHLORIDE 1000 MILLILITER(S): 9 INJECTION INTRAMUSCULAR; INTRAVENOUS; SUBCUTANEOUS at 18:09

## 2023-03-22 RX ADMIN — Medication 975 MILLIGRAM(S): at 22:09

## 2023-03-22 RX ADMIN — SODIUM CHLORIDE 1000 MILLILITER(S): 9 INJECTION INTRAMUSCULAR; INTRAVENOUS; SUBCUTANEOUS at 17:09

## 2023-03-22 RX ADMIN — Medication 10 MILLIGRAM(S): at 21:30

## 2023-03-22 NOTE — ED PROVIDER NOTE - PATIENT PORTAL LINK FT
You can access the FollowMyHealth Patient Portal offered by [facility] by registering at the following website: [email or web address]. By joining [employer]’s FollowMyHealth portal, you will also be able to view your health information using other applications (apps) compatible with our system.

## 2023-03-22 NOTE — ED PROVIDER NOTE - OBJECTIVE STATEMENT
23-year-old female past medical history PE while on Depo in 2017 status post treatment with anticoagulation, -0-1-0 presents for fatigue, headache, cough today as well as shortness of breath.  Headache fatigue started yesterday.  Headache not maximal at onset sudden in onset.  Patient reports shortness of breath feels similar to when she had her pulmonary embolism.  Patient recently started aspirin and therapeutic Lovenox given history of PE, unclear when patient was supposed to start anticoagulation.  She denies fever, chills, abdominal pain, nausea, vomiting, dysuria hematuria.  Denies vaginal bleeding or discharge denies any extremity pain or swelling.  OB yves called from triage

## 2023-03-22 NOTE — OB PERINATAL HUDDLE NOTE - NS_HUDDLEPLAN_OBGYN_ALL_OB_FT
- CTA and evaluation to rule out possible PE as per ED team   - OB RN to perform NST at bedside     Dr. Grimes at bedside   Krystal Mustafa MD, PGY4

## 2023-03-22 NOTE — ED PROVIDER NOTE - NS ED ROS FT
Constitutional: No fever. no chills.   Eyes: No visual changes, eye pain or redness  HEENT: No throat pain, hearing changes, ear pain, nasal pain. No nose bleeding.  CV: No chest pain, no palpitations  Resp: No shortness of breath, no cough  GI: No abdominal pain. No nausea. no  vomiting.   : No dysuria, hematuria. no urinary frequency, no urinary urgency.  MSK: No musculoskeletal pain  Skin: No rash, lesions, no bruises  Neuro: No headache. No numbness or tingling. No weakness. No dizziness.  Allergy/Immunology: no allergy to medicine or food.

## 2023-03-22 NOTE — ED PROVIDER NOTE - NSFOLLOWUPINSTRUCTIONS_ED_ALL_ED_FT
Rest, drink plenty of fluids.  Follow up with your primary doctor and OBGYN, if your headaches continue see a Neurologist, you can call  for follow up. Advance activity as tolerated.  Continue all previously prescribed medications as directed.  Follow up with your primary care physician in 48-72 hours- bring copies of your results.  Return to the ER for worsening or persistent symptoms, and/or ANY NEW OR CONCERNING SYMPTOMS. THIS INCLUDES BUT IS NOT LIMITED TO CHEST PAIN, INCREASING OR PERSISTENT SHORTNESS OF BREATH, SWELLING OF YOUR LEGS, SUDDEN OR SEVERE HEADACHE, HEADACHE WITH A CHANGE IN YOUR VISION SUCH AS BLURRY VISION OR FOR ANY OTHER SYMPTOMS THAT CONCERN YOU. If you have issues obtaining follow up, please call: [phone] to obtain a doctor or specialist who takes your insurance in your area.  You may call [phone] to make an appointment with the internal medicine clinic.

## 2023-03-22 NOTE — CHART NOTE - NSCHARTNOTEFT_GEN_A_CORE
NST reviewed: Baseline 150, moderate variability +10x10 accelerations  [place]: Acontractile     Reassuring fetal status at this time, reactive NST.     Appreciate remainder of eval as per ED team.     dw Dr. Chyna Mustafa MD, PGY4

## 2023-03-22 NOTE — ED ADULT TRIAGE NOTE - CHIEF COMPLAINT QUOTE
Pt arrives 24 weeks pregnant c/o SOB, headache, dizziness, fatigue since yesterday morning. Pt went to hematologist yesterday to f/u for a blood clot in 2017. Pt says "SOB feels similar to when had blood clot in lungs back in 2017". Pt is on Lovenox and baby ASA. Pt . Pt goes to [facility]. WILFRED 23. Phx: Pulmonary Embolus. FS 85.

## 2023-03-22 NOTE — ED PROVIDER NOTE - CARE PLAN
1 Principal Discharge DX:	Dyspnea  Secondary Diagnosis:	Headache  Secondary Diagnosis:	Intrauterine pregnancy

## 2023-03-22 NOTE — ED ADULT NURSE NOTE - CHIEF COMPLAINT QUOTE
Pt arrives 24 weeks pregnant c/o SOB, headache, dizziness, fatigue since yesterday morning. Pt went to hematologist yesterday to f/u for a blood clot in 2017. Pt says "SOB feels similar to when had blood clot in lungs back in 2017". Pt is on Lovenox and baby ASA. Pt . Pt goes to [facility]. WILFRED 23. Phx: Pulmonary Embolus. FS 85.

## 2023-03-22 NOTE — OB PERINATAL HUDDLE NOTE - NS_HUDDLEASSDIAG_OBGYN_ALL_OB_FT
22yo F at 23w5d (EDC 7/14) presenting to [facility] ED with shortness of breath and dizziness when standing. Patient has h/o PE while on OCPs in 2017 and reports her initial symptoms at that time were similar. HR in ED 100s, VS otherwise stable. Reports regular fetal movement, denies vaginal bleeding, contractions or loss of fluid.     PNC: [facility]

## 2023-03-22 NOTE — ED PROVIDER NOTE - CLINICAL SUMMARY MEDICAL DECISION MAKING FREE TEXT BOX
EKG normal sinus rhythm without acute ischemic changes.  Patient with constellation of symptoms that may represent viral illness, however patient reporting shortness of breath similar to when she had her pulmonary embolism back in 2017.  OB/GYN at bedside and agree with pursuing CTA to assess for pulmonary embolism.  Plan: Labs, symptom relief, CTA, bedside NST, reassess.

## 2023-03-22 NOTE — ED ADULT NURSE NOTE - ED CARDIAC HEART SOUNDS
prevent future occurrences Continue aspirin and Plavix, do not stop unless instructed by physician. Follow up with cardiologist in 1 week. Continue low cholesterol diet. Do not smoke, or drink alcohol. No strenuous activity for 3 weeks. No heavy lifting > 5-10 pound for 1 week. No driving for 24 hours. Monitor site of procedure for bleeding pain, swelling, or discharge. Notify MD if any symptoms occur. You may shower , no baths or swimming for one week after procedure. Continue with medications as prescribed. Decrease the total number of fat you eat, include fish in your diet, eat foods with high fiber. Limit alcohol and do not smoke. Maintain a healthy weight and exercise regularly. Follow up with primary care provider in 1 week. Please follow up with PCP. Limit smoking. normal S1, S2 heard

## 2023-03-22 NOTE — ED ADULT NURSE REASSESSMENT NOTE - NS ED NURSE REASSESS COMMENT FT1
report rcd from talon lewis. pt a&ox4 with c/o headache consistent with chief complaint. pt  24 weeks preg. pt no pregnancy complaints. no neuro or sensory deficits. pt denies chest pain, sob, nausea, vomiting, dizziness, headache. 20g to the LAC with no redness or swelling. pt medicated as per md orders. pt in NAD and resting in stretcher. pt respirations even and unlabored with no accessory muscle use.

## 2023-03-22 NOTE — ED PROVIDER NOTE - PHYSICAL EXAMINATION
GEN: no acute respiratory distress. nontoxic, speaking comfortably in full sentences, ambulating with steady gait.  HEENT: NCAT. face symmetrical. PERRL 4mm, EOMI, MMM, oropharynx wnl.  Neck: no JVD, trachea midline, no lymphadenopathy, FROM  CV: RRR. +S1S2, no murmur. 2+ pulses in 4 extremities, cap refill <2 sec  Chest: CTA B/l. no wheezing, rales, rhonchi. no retractions. good air movement. no tenderness.   ABD: +BS, soft, non distended, non tender.   : no cva or suprapubic tenderness  MSK: No clubbing, cyanosis, edema. FROM of all extremities. no tenderness to palpation. No midline or paraspinal tenderness. Neuro: AAOX3.  CN 2-12 intact; Sensation intact, motor 5/5 throughout.   SKIN: No rash

## 2023-03-22 NOTE — ED PROVIDER NOTE - PROGRESS NOTE DETAILS
QUYNH Corea: Received signout on pt from QUYNH Kilgore, CTA neg for PE with a limitation to the segmnetal/sub-segmental level, pt notes longstanding HA without visual change/blurry vision, improved with Reglan and Tylenol in the ED. Pt had fetal monitoring with normal variability, discussed with the OB team, no further GYN intervention. Will D/C with outpatient PCP and OB follow up. Pt was offered Neurology follow up for further evaluation of headache, pt declines at this time.

## 2023-04-01 ENCOUNTER — EMERGENCY (EMERGENCY)
Facility: HOSPITAL | Age: 23
LOS: 1 days | Discharge: HOME | End: 2023-04-01
Attending: EMERGENCY MEDICINE | Admitting: EMERGENCY MEDICINE
Payer: MEDICAID

## 2023-04-01 VITALS
DIASTOLIC BLOOD PRESSURE: 72 MMHG | TEMPERATURE: 98 F | RESPIRATION RATE: 16 BRPM | SYSTOLIC BLOOD PRESSURE: 140 MMHG | OXYGEN SATURATION: 100 % | HEART RATE: 98 BPM

## 2023-04-01 VITALS
DIASTOLIC BLOOD PRESSURE: 98 MMHG | SYSTOLIC BLOOD PRESSURE: 106 MMHG | RESPIRATION RATE: 16 BRPM | TEMPERATURE: 98 F | HEART RATE: 94 BPM | OXYGEN SATURATION: 100 %

## 2023-04-01 PROCEDURE — 93010 ELECTROCARDIOGRAM REPORT: CPT

## 2023-04-01 PROCEDURE — 99284 EMERGENCY DEPT VISIT MOD MDM: CPT

## 2023-04-01 NOTE — ED PROVIDER NOTE - PHYSICAL EXAMINATION
Physical Exam:  Gen: NAD, non-toxic appearing, awake alert   HEENT: normal conjunctiva, oral mucosa moist  Lung: CTAB, no respiratory distress, no wheezes/rhonchi/rales B/L, speaking in full sentences  CV: RRR  Abd: soft, gravid uterus, NT, ND, no guarding, no rigidity, no rebound tenderness  MSK: no visible deformities  Skin: Warm, well perfused, focal 2x2 cm hematoma w/ overlying bruise to anterior lower R shin, no leg swelling  ~Dallas Morton MD (PGY-3)

## 2023-04-01 NOTE — ED ADULT NURSE NOTE - CHIEF COMPLAINT QUOTE
Pt c/o right leg pain and swelling yesterday morning. pt is 26 weeks pregnant due 7/13 with first baby. Pt states she noticed a lump on her right leg and pain in her calf. pt has hx of PE in 2017. Pt was placed on lovenox for clotting issue at 13 weeks. denies any abd pain, vaginal bleeding/discharge. denies any inj or trauma to leg. No complaints of chest pain, headache, nausea, dizziness, vomiting  SOB, fever, chills verbalized. pt to be seen in ED as per L&D

## 2023-04-01 NOTE — ED ADULT TRIAGE NOTE - CHIEF COMPLAINT QUOTE
Pt c/o right leg pain and swelling yesterday morning. pt is 26 weeks pregnant due 7/13 with first baby. Pt states she noticed a lump on her right leg and pain in her calf. pt has hx of PE in 2017. Pt was placed on lovenox for clotting issue at 13 weeks. denies any inj or trauma to leg. No complaints of chest pain, headache, nausea, dizziness, vomiting  SOB, fever, chills verbalized. Pt c/o right leg pain and swelling yesterday morning. pt is 26 weeks pregnant due 7/13 with first baby. Pt states she noticed a lump on her right leg and pain in her calf. pt has hx of PE in 2017. Pt was placed on lovenox for clotting issue at 13 weeks. denies any abd pain, vaginal bleeding/discharge. denies any inj or trauma to leg. No complaints of chest pain, headache, nausea, dizziness, vomiting  SOB, fever, chills verbalized. pt to be seen in ED as per L&D

## 2023-04-01 NOTE — ED PROVIDER NOTE - NSFOLLOWUPINSTRUCTIONS_ED_ALL_ED_FT
You were evaluated today for a bruise of the right leg    Continue to take your medications as previously prescribed by your doctors    Return to the Emergency Department if you have any new or worsening symptoms

## 2023-04-01 NOTE — ED PROVIDER NOTE - PATIENT PORTAL LINK FT
You can access the FollowMyHealth Patient Portal offered by [facility] by registering at the following website: [email or web address]. By joining [facility]’s FollowMyHealth portal, you will also be able to view your health information using other applications (apps) compatible with our system.

## 2023-04-01 NOTE — ED PROVIDER NOTE - NS ED ROS FT
CONST: no fevers, no chills  ENT: no sore throat  CV: no chest pain, no leg swelling  RESP: no shortness of breath, no cough  ABD: no abdominal pain, no nausea, no vomiting, no diarrhea  : no dysuria, no flank pain, no hematuria  MSK: no back pain, +extremity pain  SKIN:  +bruise

## 2023-04-01 NOTE — ED PROVIDER NOTE - ATTENDING CONTRIBUTION TO CARE
HPI: 23F  (26 weeks along, was started on lovenox at 13 weeks) PMH PE while on Depo in 2017 status post treatment with anticoagulation p/w R leg "lump" that started a few hrs ago. Noted lump and bruising to anterior R lower shin; states she was using a massage gun to the region due to a sciatica exacerbation earlier today. No f/c, cp/sob, abd pain, leg swelling  OB does not remember name but located in [place]  EXAM: NAD, well appearing, No unilateral leg swelling, right anterior lower shin with ecchymposis, tenderness to palpation small area anterior shin, no open wounds, DP/PT pulses palpable, equal girth to left lower leg, both are warm and well perfused. no palpable POP masses. gait steady.   MDM: 24 yo F with history of PE currently changed from 40 mg Lovenox to 120 mg lovenox today by her OBGYN that presents with anterior Right chin ecchymosis and bump which is likely due to trauma to her leg which  states they were using a massage gun to the area earlier in the evening. Due to pt history of PE she was concerned and came to ED for eval. Unlikely DVT given anterior nature of her area of concern and not posterior leg, both legs are equal in girth, no unilateral leg edema, normal pulses and normal exam otherwise. pt already on lovenox as well and baby ASA. Pt bruising likely from being on lovenox and leading to easy vascular injury. Explained this condition to pt and  and they are aware and amenable to discharge and to monitor area, return precautions explained and understood. Also warned pt that she should be careful and avoid any conditions that can lead to trauma as she is on a blood thinner medication.

## 2023-04-01 NOTE — ED PROVIDER NOTE - CLINICAL SUMMARY MEDICAL DECISION MAKING FREE TEXT BOX
23F  (26 weeks along, was started on lovenox at 13 weeks) PMH PE while on Depo in 2017 status post treatment with anticoagulation p/w R leg "lump" that started a few hrs ago. VS and exam as above  Likely bruising due to minor trauma in setting of A/C use. Low concern for DVT as no calf pain or leg swleling. Will d/c to home

## 2023-04-01 NOTE — ED PROVIDER NOTE - OBJECTIVE STATEMENT
p/w R leg lump/swelling that started a few hrs ago    OB does not remember name but located in [place]    currently on asa and lovenox 23F  (26 weeks along, was started on lovenox at 13 weeks) PMH PE while on Depo in 2017 status post treatment with anticoagulation p/w R leg "lump" that started a few hrs ago. Noted lump and bruising to anterior R lower shin; states she was using a theragun to the region due to a sciatica exacerbation earlier today. No f/c, cp/sob, abd pain, leg swelling    OB does not remember name but located in [place]    currently on asa and lovenox

## 2023-04-01 NOTE — ED ADULT NURSE NOTE - OBJECTIVE STATEMENT
Pt received to room 3. A&Ox4, amb at baseline. Pmh of PE. I did not personally evaluate. MD at bedside for evaluation. Endorses pt was stable to be discharged without further workup. Pt c/o R lower leg bruising and lump post using theragun. Pt states she was using theragun for sciatica exacerbation. No bleeding from site or lower leg swelling noted per MD. Pt discharged by MD. Vitally stable Per triage vitals.

## 2023-06-23 NOTE — PATIENT PROFILE ADULT. - SOCIAL CONCERNS
Reason for Disposition  • [1] Looks infected (spreading redness, red streak, pus) AND [2] severe pain with movement    Protocols used: FINGER PAIN-A-AH    
PCP:   Dr.Pamela susan Escalante         Onset: 6/23/23  Morning   Location / description: Pain  Middle finger left hand   Precipitating Factors: stitches come out to middle today on there own flesh is showing    Pain Scale (1-10), 10 highest: 9/10  Associated Symptoms: flesh showing to middle finger   What improves / worsens symptoms: unable to grab items due to pain to middle fingers  Symptom specific medications: n/a   Recent visits (last 3-4 weeks) for same reason or recent surgery: No.No.     PLAN:   Directed to Emergency Department    Patient/Caller agrees to follow recommendations.  
Regarding: male 28 had an accident where he was carrying a picture frame and dropped it and he has a cut on  ----- Message from Kandy Carnes sent at 6/23/2023 11:01 AM CDT -----  Patient Name: Austin Turpin  Caller: self  Call Back # : [phone]    Is this for an Active episode for Home Health, Hospice or Palliative Care? No   Specialist or PCP Name: [facility]CP [facility]  Symptoms: male 28 had an accident where he was carrying a picture frame and dropped it and he has a cut on both fingers and he went to the hospital and got stiches and on one finger they came undone and he wants to know should he go back to the emergency room  Pregnant (females aged 13-60. If Yes, how long?) : no  Name of Clinic Site / Acct# : none      Are you currently at (or near) your place of residence? Yes [place] [address]     Use following scripting for patients waiting for a callback:   \"Nurse callback times vary based on call volumes; please be aware the return phone call may come from an unidentified or out of state phone number. If your symptoms worsen or become life threatening while waiting, you should seek immediate assistance by calling 911 or going to the ER for evaluation.\"    
None

## 2023-07-30 ENCOUNTER — EMERGENCY (EMERGENCY)
Facility: HOSPITAL | Age: 23
LOS: 1 days | Discharge: HOME | End: 2023-07-30
Admitting: EMERGENCY MEDICINE
Payer: MEDICAID

## 2023-07-30 VITALS
OXYGEN SATURATION: 98 % | TEMPERATURE: 99 F | SYSTOLIC BLOOD PRESSURE: 116 MMHG | HEART RATE: 76 BPM | DIASTOLIC BLOOD PRESSURE: 82 MMHG | RESPIRATION RATE: 17 BRPM

## 2023-07-30 VITALS
SYSTOLIC BLOOD PRESSURE: 134 MMHG | OXYGEN SATURATION: 100 % | RESPIRATION RATE: 18 BRPM | TEMPERATURE: 98 F | DIASTOLIC BLOOD PRESSURE: 85 MMHG | HEART RATE: 88 BPM

## 2023-07-30 LAB
ALBUMIN SERPL ELPH-MCNC: 4.1 G/DL — SIGNIFICANT CHANGE UP (ref 3.3–5)
ALP SERPL-CCNC: 58 U/L — SIGNIFICANT CHANGE UP (ref 40–120)
ALT FLD-CCNC: 33 U/L — SIGNIFICANT CHANGE UP (ref 4–33)
ANION GAP SERPL CALC-SCNC: 15 MMOL/L — HIGH (ref 7–14)
APTT BLD: 38.9 SEC — HIGH (ref 24.5–35.6)
AST SERPL-CCNC: 69 U/L — HIGH (ref 4–32)
BASOPHILS # BLD AUTO: 0.03 K/UL — SIGNIFICANT CHANGE UP (ref 0–0.2)
BASOPHILS NFR BLD AUTO: 0.3 % — SIGNIFICANT CHANGE UP (ref 0–2)
BILIRUB SERPL-MCNC: 0.2 MG/DL — SIGNIFICANT CHANGE UP (ref 0.2–1.2)
BLD GP AB SCN SERPL QL: NEGATIVE — SIGNIFICANT CHANGE UP
BUN SERPL-MCNC: 8 MG/DL — SIGNIFICANT CHANGE UP (ref 7–23)
CALCIUM SERPL-MCNC: 9.7 MG/DL — SIGNIFICANT CHANGE UP (ref 8.4–10.5)
CHLORIDE SERPL-SCNC: 102 MMOL/L — SIGNIFICANT CHANGE UP (ref 98–107)
CO2 SERPL-SCNC: 21 MMOL/L — LOW (ref 22–31)
CREAT SERPL-MCNC: 0.91 MG/DL — SIGNIFICANT CHANGE UP (ref 0.5–1.3)
EGFR: 90 ML/MIN/1.73M2 — SIGNIFICANT CHANGE UP
EOSINOPHIL # BLD AUTO: 0.18 K/UL — SIGNIFICANT CHANGE UP (ref 0–0.5)
EOSINOPHIL NFR BLD AUTO: 2 % — SIGNIFICANT CHANGE UP (ref 0–6)
GLUCOSE SERPL-MCNC: 95 MG/DL — SIGNIFICANT CHANGE UP (ref 70–99)
HCT VFR BLD CALC: 38.8 % — SIGNIFICANT CHANGE UP (ref 34.5–45)
HGB BLD-MCNC: 12.3 G/DL — SIGNIFICANT CHANGE UP (ref 11.5–15.5)
IANC: 4.43 K/UL — SIGNIFICANT CHANGE UP (ref 1.8–7.4)
IMM GRANULOCYTES NFR BLD AUTO: 0.5 % — SIGNIFICANT CHANGE UP (ref 0–0.9)
INR BLD: 1.02 RATIO — SIGNIFICANT CHANGE UP (ref 0.85–1.18)
LYMPHOCYTES # BLD AUTO: 3.41 K/UL — HIGH (ref 1–3.3)
LYMPHOCYTES # BLD AUTO: 38.7 % — SIGNIFICANT CHANGE UP (ref 13–44)
MCHC RBC-ENTMCNC: 27 PG — SIGNIFICANT CHANGE UP (ref 27–34)
MCHC RBC-ENTMCNC: 31.7 GM/DL — LOW (ref 32–36)
MCV RBC AUTO: 85.1 FL — SIGNIFICANT CHANGE UP (ref 80–100)
MONOCYTES # BLD AUTO: 0.73 K/UL — SIGNIFICANT CHANGE UP (ref 0–0.9)
MONOCYTES NFR BLD AUTO: 8.3 % — SIGNIFICANT CHANGE UP (ref 2–14)
NEUTROPHILS # BLD AUTO: 4.43 K/UL — SIGNIFICANT CHANGE UP (ref 1.8–7.4)
NEUTROPHILS NFR BLD AUTO: 50.2 % — SIGNIFICANT CHANGE UP (ref 43–77)
NRBC # BLD: 0 /100 WBCS — SIGNIFICANT CHANGE UP (ref 0–0)
NRBC # FLD: 0 K/UL — SIGNIFICANT CHANGE UP (ref 0–0)
PLATELET # BLD AUTO: 304 K/UL — SIGNIFICANT CHANGE UP (ref 150–400)
POTASSIUM SERPL-MCNC: 3.9 MMOL/L — SIGNIFICANT CHANGE UP (ref 3.5–5.3)
POTASSIUM SERPL-SCNC: 3.9 MMOL/L — SIGNIFICANT CHANGE UP (ref 3.5–5.3)
PROT SERPL-MCNC: 7.9 G/DL — SIGNIFICANT CHANGE UP (ref 6–8.3)
PROTHROM AB SERPL-ACNC: 11.5 SEC — SIGNIFICANT CHANGE UP (ref 9.5–13)
RBC # BLD: 4.56 M/UL — SIGNIFICANT CHANGE UP (ref 3.8–5.2)
RBC # FLD: 13.3 % — SIGNIFICANT CHANGE UP (ref 10.3–14.5)
RH IG SCN BLD-IMP: POSITIVE — SIGNIFICANT CHANGE UP
SODIUM SERPL-SCNC: 138 MMOL/L — SIGNIFICANT CHANGE UP (ref 135–145)
WBC # BLD: 8.82 K/UL — SIGNIFICANT CHANGE UP (ref 3.8–10.5)
WBC # FLD AUTO: 8.82 K/UL — SIGNIFICANT CHANGE UP (ref 3.8–10.5)

## 2023-07-30 PROCEDURE — 76830 TRANSVAGINAL US NON-OB: CPT | Mod: 26

## 2023-07-30 PROCEDURE — 93010 ELECTROCARDIOGRAM REPORT: CPT

## 2023-07-30 PROCEDURE — 99285 EMERGENCY DEPT VISIT HI MDM: CPT

## 2023-07-30 NOTE — CONSULT NOTE ADULT - ASSESSMENT
23 y.o.  presenting to the ED PPD#27 from  due to two episodes of vaginal bleeding in past two days. Physical exam benign, labs within normal limits with no signs of infection. Counselled patient on expectant management, medical management, and surgical management. Patient would like to have outpatient Rx for medical management.     - No acute GYN interventions at this time   - Infection precautions given  - misoprostol 600mcg orally Rx   - Ipuprofen before taking Rx   - Patient to make closed appt with OB     d/w Dr. Santacruz 23 y.o.  presenting to the ED PPD#27 from  due to two episodes of vaginal bleeding in past two days. Physical exam benign, labs within normal limits with no signs of infection. TVUS significant for endometrium thickened to 18mm w/ heterogenous appearance. Counselled patient on expectant management, medical management, and surgical management. Patient would like to have outpatient Rx for medical management of possible retained POC.     - No acute GYN interventions at this time   - Infection precautions given  - misoprostol 600mcg orally Rx   - Advise patient to take Ibuprofen prior to Cytotec, may send Percocet 5mg for pain + Zofran prn for nausea   - Patient to f/u with [facility]     d/w Dr. Santacruz 23 y.o.  presenting to the ED PPD#27 from [facility] due to two episodes of vaginal bleeding in past two days. Physical exam benign, labs within normal limits with no signs of infection. TVUS significant for endometrium thickened to 18mm w/ heterogenous appearance. Counselled patient on expectant management, medical management, and surgical management. Patient would like to have outpatient Rx for medical management of possible retained POC.     - No acute GYN interventions at this time   - Infection precautions given  - misoprostol 600mcg orally Rx   - Advise patient to take Ibuprofen prior to Cytotec, may send Percocet 5mg for pain + Zofran prn for nausea   - Patient to f/u with [facility]     d/w Dr. Santacruz    22 y/o  PPD#27 from [facility] c/o two episodes of vaginal bleeding in past two days. TVUS suggestive of retained POC's, pt clinically stable. Advise Cytotec 600mcg x1 and f/u as an outpatient.  Erasmo Santacruz M.D.

## 2023-07-30 NOTE — CONSULT NOTE ADULT - SUBJECTIVE AND OBJECTIVE BOX
JULIETA WEBSTER  23y  Female [id number]    HPI: 23 y.o.  presenting to the ED PPD#27 from  due to two episodes of vaginal bleeding in past two days. Patient reports  uncomplicated w/ 1st deg laceration. She then has had vaginal bleeding which has almost stopped when she passed orange sized clot yesterday + today, last at 6pm. She denies abdominal pain, fevers, chills, NVD.       Ob/Gyn Physician: [facility], reports she has postpartum appt in 2 weeks.    Obhx:   -  /3 IOL @38w w/ 1sty deg lac, patient reports delivery uncomplicated  - eTOP s/p medAB   GynHx: Denies fibroids, cysts, abnormal pap smears, STIs  PMH: provoked PE in 2017   PSH: denies  FMH:  Social: Denies Toxic Habits x3; denies anxiety/depression  Meds:  Allergies: NKDA        Vital Signs Last 24 Hrs  T(C): 37 (2023 23:41), Max: 37 (2023 23:41)  T(F): 98.6 (2023 23:41), Max: 98.6 (2023 23:41)  HR: 76 (2023 23:41) (76 - 88)  BP: 116/82 (2023 23:41) (116/82 - 134/85)  BP(mean): --  RR: 17 (2023 23:41) (17 - 18)  SpO2: 98% (2023 23:41) (98% - 100%)    Parameters below as of 2023 23:41  Patient On (Oxygen Delivery Method): room air      Physical Exam:   General: sitting comfortably in bed, NAD   CV: extremities well perfused   Lungs: breathing comfortably on room air  Back: No CVA tenderness  Abd: Soft, non-tender, non-distended,  +BS  :  No bleeding on pad.  External labia wnl.   Speculum Exam: declined    LABS:                              12.3   8.82  )-----------( 304      ( 2023 21:15 )             38.8     07-30    138  |  102  |  8   ----------------------------<  95  3.9   |  21<L>  |  0.91    Ca    9.7      2023 21:15    TPro  7.9  /  Alb  4.1  /  TBili  0.2  /  DBili  x   /  AST  69<H>  /  ALT  33  /  AlkPhos  58      I&O's Detail    PT/INR - ( 2023 21:15 )   PT: 11.5 sec;   INR: 1.02 ratio         PTT - ( 2023 21:15 )  PTT:38.9 sec  Urinalysis Basic - ( 2023 21:15 )    Color: x / Appearance: x / SG: x / pH: x  Gluc: 95 mg/dL / Ketone: x  / Bili: x / Urobili: x   Blood: x / Protein: x / Nitrite: x   Leuk Esterase: x / RBC: x / WBC x   Sq Epi: x / Non Sq Epi: x / Bacteria: x        RADIOLOGY & ADDITIONAL STUDIES:    < from: US Transvaginal (23 @ 22:19) >    ACC: [id number] EXAM:  US TRANSVAGINAL   ORDERED BY: NOLA STRANGE     PROCEDURE DATE:  2023          INTERPRETATION:  CLINICAL INFORMATION: Heavy vaginal bleeding. Status   post vaginal delivery on 7/3/2023.    LMP: Recent  on 7/3/2023    COMPARISON: Pelvic ultrasound 2023    TECHNIQUE:  Endovaginal pelvic sonogram only. Color and Spectral Doppler was   performed.    FINDINGS:  Uterus: 9.7 cm x 6.0 cm x 8.2 cm. Mildly enlarged and heterogeneous   postpartum uterus.  Endometrium: 18 mm. Thickened and heterogeneous with increased   vascularity on color Doppler. Arterial waveforms on spectral Doppler.    Right ovary: 3.5 cm x 1.8 cm x 3.5 cm. Peripheral hyperechoic lesion   within the right ovary measuring 0.6 x 0.5 x 0.8 cm, possibly   representing a corpus albicans. Normal arterial and venous waveforms.  Left ovary: 3.8 cm x 1.7 cm x 1.8 cm. Within normal limits. Normal   arterial and venous waveforms.    Fluid: None.    IMPRESSION:    Thickening of the endometrium with increased vascularity on color and   spectral Doppler concerning concerning for retained products of   conception.    These findings were discussed with QUYNH Strange at 2023 10:28 PM by Dr. Esparza with read back confirmation.    --- End of Report ---          MELIDA HARRISON DO; Resident Radiologist  This document has been electronically signed.  WALTER TABOR DO; Attending Radiologist  This document has been electronically signed. 2023 10:49PM    < end of copied text >

## 2023-07-30 NOTE — ED PROVIDER NOTE - OBJECTIVE STATEMENT
23-year-old female PMH G1, P1, history of PE secondary to Depo injections on Lovenox during pregnancy, presenting to ED with vaginal bleeding/clots.  Patient states that she gave birth, had  on 7/3 at near [facility], and has had mild vaginal bleeding since however last night had a large clot as well as this morning associated with heavier vaginal bleeding today.  Denies soaking multiple pads per hour.  Denies any lightheadedness, dizziness, shortness of breath, palpitations or chest pain.  No pelvic pain, abdominal pain, dysuria, frequent urination, N/V/D/C.

## 2023-07-30 NOTE — ED PROVIDER NOTE - NSFOLLOWUPINSTRUCTIONS_ED_ALL_ED_FT
Take cytotec (3 tabs) 600mg all at once  take ibuprofen up to 600mg with food just prior to help alleviate cramping  may also use heat, warm compresses  can also take tylenol up to 1000mg for breakthrough pain every 4-6 hours  See your primary care doctor within 24-48 hours for a post-hospital visit.   See your gyn this week for further evaluation, bring copies of all reports with you.   PELVIC REST UNTIL YOU ARE CLEARED BY YOUR GYN- no intercourse/douching/foreign objects in the vagina. Return to the ER for worsening bleeding, abdominal pain, fevers, dizziness or any other concerns.

## 2023-07-30 NOTE — ED PROVIDER NOTE - NSFOLLOWUPCLINICS_GEN_ALL_ED_FT
[facility] Gynecology and Obstetrics  Gynceology/OB  [address] [place] [address]  Phone: [phone]  Fax:

## 2023-07-30 NOTE — ED PROVIDER NOTE - PATIENT PORTAL LINK FT
You can access the FollowMyHealth Patient Portal offered by [facility] by registering at the following website: [email or web address]. By joining [employer]’s FollowMyHealth portal, you will also be able to view your health information using other applications (apps) compatible with our system.

## 2023-07-30 NOTE — ED ADULT NURSE NOTE - OBJECTIVE STATEMENT
pt presents to ED A&04 ambulatory at baseline coming in complaining of vaginal bleeding passing clots a/w cramping since yesterday. Patient had vaginal delivery on 7/3 at [facility]. Respirations even and unlabored. Lung sounds clear with equal chest rise bilaterally. ABD is soft, non tender, non distended with normal active bowel sounds No complaints of chest pain, headache, nausea, dizziness, vomiting  SOB, fever, chills, dysuria/hematuria. 20GLAC in place. awaiting US

## 2023-07-30 NOTE — ED PROVIDER NOTE - CLINICAL SUMMARY MEDICAL DECISION MAKING FREE TEXT BOX
23-year-old female PMH G1, P1, history of PE secondary to Depo injections on Lovenox during pregnancy, presenting to ED with vaginal bleeding/clots.  Patient states that she gave birth, had  on 7/3 at near [facility], and has had mild vaginal bleeding since however last night had a large clot as well as this morning associated with heavier vaginal bleeding today.  Denies soaking multiple pads per hour.  Denies any lightheadedness, dizziness, shortness of breath, palpitations or chest pain.  No pelvic pain, abdominal pain, dysuria, frequent urination, N/V/D/C.    will check labs to r/o anemia  TVUS to r/o pelvic abnormality  reasssess

## 2023-07-30 NOTE — ED PROVIDER NOTE - INTERNATIONAL TRAVEL
----- Message from Syeda Craig sent at 7/26/2022  8:21 AM EDT -----  Regarding: Mammo  07/26/22 8:21 AM    Hello, our patient Gabriel Jacques has had Mammogram completed/performed  Please assist in updating the patient chart by pulling the Care Everywhere (CE) document  The date of service is 5/3/2021       Thank you,  Adidson Isbell MA  [facility] [address] [facility]
No

## 2023-07-30 NOTE — ED ADULT TRIAGE NOTE - CHIEF COMPLAINT QUOTE
vag delivery on 7/3 at [facility]- p/w vaginal bleeding and pelvic pain/cramping since yesterday, on daily lovenox for hx PE- appears in NAD

## 2023-07-30 NOTE — ED PROVIDER NOTE - PHYSICAL EXAMINATION
CONSTITUTIONAL: Well-appearing; well-nourished; in no apparent distress;  HEAD: Normocephalic, atraumatic;  EYES: conjunctiva and sclera WNL;  NECK/LYMPH: Supple  CARD: Normal S1, S2; no murmurs, rubs, or gallops noted  RESP: Normal chest excursion with respiration; breath sounds clear and equal bilaterally; no wheezes, rhonchi, or rales noted  ABD/GI: soft, non-distended; non-tender; no palpable organomegaly, no pulsatile mass  PELVIC: performed via speculum exam with Female Chaperone RN Barbara Shea. no external vulvar lesions/rashes or abnormalities. cervical os closed. no mucopurulent discharge. + vaginal bleeding noted. Bimanual exam: negative CMT, negative adnexal ttp.  EXT/MS: moves all extremities; distal pulses are normal, no pedal edema  SKIN: Normal for age and race; warm; dry; good turgor  NEURO: Awake, alert, oriented x 3, no gross deficits  PSYCH: Normal mood; appropriate affect

## 2023-07-30 NOTE — ED ADULT NURSE NOTE - CHIEF COMPLAINT QUOTE
vag delivery on 7/3 at [facility]- p/w vaginal bleeding and pelvic pain/cramping since yesterday, on daily lovenox for hx PE- appears in NAD

## 2023-07-31 RX ORDER — MISOPROSTOL 200 UG/1
3 TABLET ORAL
Qty: 3 | Refills: 0
Start: 2023-07-31 | End: 2023-07-31

## 2023-07-31 RX ORDER — IBUPROFEN 200 MG
1 TABLET ORAL
Qty: 15 | Refills: 0
Start: 2023-07-31 | End: 2023-08-04

## 2023-08-30 NOTE — ED PROVIDER NOTE - NEUROLOGICAL, MLM
acetaminophen 500 mg oral tablet: 2 tab(s) orally every 8 hours  aspirin 81 mg oral delayed release tablet: 1 tab(s) orally 2 times a day  magnesium hydroxide 8% oral suspension: 30 milliliter(s) orally once a day As needed Constipation  oxyCODONE 10 mg oral tablet: 1 tab(s) orally every 4 hours as needed for Severe Pain (7 - 10)  oxyCODONE 5 mg oral tablet: 1 tab(s) orally every 4 hours As needed Moderate Pain (4 - 6)   Alert and oriented, no focal deficits, no motor or sensory deficits.

## 2023-09-08 ENCOUNTER — EMERGENCY (EMERGENCY)
Facility: HOSPITAL | Age: 23
LOS: 1 days | Discharge: HOME | End: 2023-09-08
Attending: EMERGENCY MEDICINE | Admitting: EMERGENCY MEDICINE
Payer: MEDICAID

## 2023-09-08 VITALS
RESPIRATION RATE: 16 BRPM | HEART RATE: 99 BPM | SYSTOLIC BLOOD PRESSURE: 117 MMHG | TEMPERATURE: 98 F | DIASTOLIC BLOOD PRESSURE: 77 MMHG | OXYGEN SATURATION: 100 %

## 2023-09-08 LAB
ALBUMIN SERPL ELPH-MCNC: 4.4 G/DL — SIGNIFICANT CHANGE UP (ref 3.3–5)
ALP SERPL-CCNC: 47 U/L — SIGNIFICANT CHANGE UP (ref 40–120)
ALT FLD-CCNC: 13 U/L — SIGNIFICANT CHANGE UP (ref 4–33)
ANION GAP SERPL CALC-SCNC: 9 MMOL/L — SIGNIFICANT CHANGE UP (ref 7–14)
AST SERPL-CCNC: 13 U/L — SIGNIFICANT CHANGE UP (ref 4–32)
BASOPHILS # BLD AUTO: 0.03 K/UL — SIGNIFICANT CHANGE UP (ref 0–0.2)
BASOPHILS NFR BLD AUTO: 0.4 % — SIGNIFICANT CHANGE UP (ref 0–2)
BILIRUB SERPL-MCNC: <0.2 MG/DL — SIGNIFICANT CHANGE UP (ref 0.2–1.2)
BLD GP AB SCN SERPL QL: NEGATIVE — SIGNIFICANT CHANGE UP
BUN SERPL-MCNC: 10 MG/DL — SIGNIFICANT CHANGE UP (ref 7–23)
CALCIUM SERPL-MCNC: 9.5 MG/DL — SIGNIFICANT CHANGE UP (ref 8.4–10.5)
CHLORIDE SERPL-SCNC: 106 MMOL/L — SIGNIFICANT CHANGE UP (ref 98–107)
CO2 SERPL-SCNC: 26 MMOL/L — SIGNIFICANT CHANGE UP (ref 22–31)
CREAT SERPL-MCNC: 1.12 MG/DL — SIGNIFICANT CHANGE UP (ref 0.5–1.3)
EGFR: 70 ML/MIN/1.73M2 — SIGNIFICANT CHANGE UP
EOSINOPHIL # BLD AUTO: 0.09 K/UL — SIGNIFICANT CHANGE UP (ref 0–0.5)
EOSINOPHIL NFR BLD AUTO: 1.1 % — SIGNIFICANT CHANGE UP (ref 0–6)
GLUCOSE SERPL-MCNC: 91 MG/DL — SIGNIFICANT CHANGE UP (ref 70–99)
HCG SERPL-ACNC: <1 MIU/ML — SIGNIFICANT CHANGE UP
HCT VFR BLD CALC: 31.4 % — LOW (ref 34.5–45)
HGB BLD-MCNC: 9.5 G/DL — LOW (ref 11.5–15.5)
IANC: 4.66 K/UL — SIGNIFICANT CHANGE UP (ref 1.8–7.4)
IMM GRANULOCYTES NFR BLD AUTO: 0.4 % — SIGNIFICANT CHANGE UP (ref 0–0.9)
INR BLD: 1.06 RATIO — SIGNIFICANT CHANGE UP (ref 0.85–1.18)
LIDOCAIN IGE QN: 36 U/L — SIGNIFICANT CHANGE UP (ref 7–60)
LYMPHOCYTES # BLD AUTO: 2.49 K/UL — SIGNIFICANT CHANGE UP (ref 1–3.3)
LYMPHOCYTES # BLD AUTO: 31.3 % — SIGNIFICANT CHANGE UP (ref 13–44)
MAGNESIUM SERPL-MCNC: 2.1 MG/DL — SIGNIFICANT CHANGE UP (ref 1.6–2.6)
MCHC RBC-ENTMCNC: 25.7 PG — LOW (ref 27–34)
MCHC RBC-ENTMCNC: 30.3 GM/DL — LOW (ref 32–36)
MCV RBC AUTO: 85.1 FL — SIGNIFICANT CHANGE UP (ref 80–100)
MONOCYTES # BLD AUTO: 0.66 K/UL — SIGNIFICANT CHANGE UP (ref 0–0.9)
MONOCYTES NFR BLD AUTO: 8.3 % — SIGNIFICANT CHANGE UP (ref 2–14)
NEUTROPHILS # BLD AUTO: 4.66 K/UL — SIGNIFICANT CHANGE UP (ref 1.8–7.4)
NEUTROPHILS NFR BLD AUTO: 58.5 % — SIGNIFICANT CHANGE UP (ref 43–77)
NRBC # BLD: 0 /100 WBCS — SIGNIFICANT CHANGE UP (ref 0–0)
NRBC # FLD: 0 K/UL — SIGNIFICANT CHANGE UP (ref 0–0)
NT-PROBNP SERPL-SCNC: <36 PG/ML — SIGNIFICANT CHANGE UP
PLATELET # BLD AUTO: 346 K/UL — SIGNIFICANT CHANGE UP (ref 150–400)
POTASSIUM SERPL-MCNC: 3.8 MMOL/L — SIGNIFICANT CHANGE UP (ref 3.5–5.3)
POTASSIUM SERPL-SCNC: 3.8 MMOL/L — SIGNIFICANT CHANGE UP (ref 3.5–5.3)
PROT SERPL-MCNC: 7.9 G/DL — SIGNIFICANT CHANGE UP (ref 6–8.3)
PROTHROM AB SERPL-ACNC: 11.8 SEC — SIGNIFICANT CHANGE UP (ref 9.5–13)
RBC # BLD: 3.69 M/UL — LOW (ref 3.8–5.2)
RBC # FLD: 13.2 % — SIGNIFICANT CHANGE UP (ref 10.3–14.5)
RH IG SCN BLD-IMP: POSITIVE — SIGNIFICANT CHANGE UP
SODIUM SERPL-SCNC: 141 MMOL/L — SIGNIFICANT CHANGE UP (ref 135–145)
TROPONIN T, HIGH SENSITIVITY RESULT: 7 NG/L — SIGNIFICANT CHANGE UP
TROPONIN T, HIGH SENSITIVITY RESULT: <6 NG/L — SIGNIFICANT CHANGE UP
WBC # BLD: 7.96 K/UL — SIGNIFICANT CHANGE UP (ref 3.8–10.5)
WBC # FLD AUTO: 7.96 K/UL — SIGNIFICANT CHANGE UP (ref 3.8–10.5)

## 2023-09-08 PROCEDURE — 93970 EXTREMITY STUDY: CPT | Mod: 26

## 2023-09-08 PROCEDURE — 71275 CT ANGIOGRAPHY CHEST: CPT | Mod: 26,MA

## 2023-09-08 PROCEDURE — 93010 ELECTROCARDIOGRAM REPORT: CPT

## 2023-09-08 PROCEDURE — 99285 EMERGENCY DEPT VISIT HI MDM: CPT

## 2023-09-08 RX ORDER — ACETAMINOPHEN 500 MG
1000 TABLET ORAL ONCE
Refills: 0 | Status: COMPLETED | OUTPATIENT
Start: 2023-09-08 | End: 2023-09-08

## 2023-09-08 RX ADMIN — Medication 400 MILLIGRAM(S): at 19:05

## 2023-09-08 NOTE — ED ADULT NURSE NOTE - NSFALLUNIVINTERV_ED_ALL_ED
Bed/Stretcher in lowest position, wheels locked, appropriate side rails in place/Call bell, personal items and telephone in reach/Instruct patient to call for assistance before getting out of bed/chair/stretcher/Non-slip footwear applied when patient is off stretcher/[place] to call system/Physically safe environment - no spills, clutter or unnecessary equipment/Purposeful proactive rounding/Room/bathroom lighting operational, light cord in reach

## 2023-09-08 NOTE — ED PROVIDER NOTE - PATIENT PORTAL LINK FT
You can access the FollowMyHealth Patient Portal offered by [facility] by registering at the following website: [email or web address]. By joining [employer]’s FollowMyHealth portal, you will also be able to view your health information using other applications (apps) compatible with our system.

## 2023-09-08 NOTE — ED PROVIDER NOTE - PHYSICAL EXAMINATION
GEN:  Non-toxic appearing, non-distressed, speaking full sentences, non-diaphoretic, AAOx3  HEENT:  NCAT, neck supple, EOMI, PERRLA, sclera anicteric, no conjunctival pallor or injection, no stridor, normal voice, no tonsillar exudate, uvula midline  CV:  regular rhythm and rate, s1/s2 audible, no murmurs, rubs or gallops, peripheral pulses 2+ and symmetric  PULM:  non-labored respirations, lungs clear to auscultation bilaterally, no wheezes, crackles or rales  ABD:  non distended, non-tender, no rebound, no guarding, negative Galvan's sign, bowel sounds normal, no cvat  MSK:  no gross deformity, non-tender extremities and joints, range of motion grossly normal appearing, no extremity edema, extremities warm and well perfused   NEURO:  AAOx3, CN II-XII intact, motor 5/5 in upper and lower extremities bilaterally, sensation grossly intact in extremities and trunk, finger to nose testing wnl, no nystagmus, negative Romberg, no pronator drift, no gait deficit  SKIN:  warm, dry, no rash or vesicles

## 2023-09-08 NOTE — ED PROVIDER NOTE - OBJECTIVE STATEMENT
23-year-old female past medical history of PE, anemia, currently on Lovenox presents with extremity pain, chest pain starting approximately 1 week ago.  Patient states that she is experiencing pain in elbows, wrists bilaterally rating to forearms and hands, dull, throbbing, worse with movement, no associated swelling or redness.  Patient also states that she has been having right-sided, pleuritic chest pain, nonexertional, nonradiating, no alleviating factors.  Also reports alternating bilateral leg pain for approximately 1 week.  Patient saw her hematologist recently and was told that symptoms may be due to anemia.  Patient reports heavy vaginal bleeding in the past although is not bleeding currently.  Patient had  in early July, postpartum.  Complicated by vaginal bleeding and retained products where she was seen at outside ED on 818 and discharged same night without procedure.  Patient states that she missed approximately 4 doses of her Lovenox in the last week.  Patient denies fever, chills, nausea, vomiting, shortness of breath, abdominal pain, diarrhea, bloody or dark stools, dysuria, leg swelling, numbness, tingling, weakness.

## 2023-09-08 NOTE — ED ADULT TRIAGE NOTE - CHIEF COMPLAINT QUOTE
pt c/o 2 weeks worsening joint pain in hand and knees with tenderness pain in right side of chest at times. denies fevers, infections. pt 2 months post -partum on Lovenox for DVT

## 2023-09-08 NOTE — ED PROVIDER NOTE - CLINICAL SUMMARY MEDICAL DECISION MAKING FREE TEXT BOX
23-year-old female past medical history of PE, anemia, currently on Lovenox presents with extremity pain, chest pain starting approximately 1 week ago.  Vital signs stable, afebrile.  EKG nonischemic and no strain pattern.  Exam without extremity tenderness, joint swelling, neurovascular deficit.  Cardiopulmonary exam without abnormality.  Low concern for acute coronary syndrome as chest pain nonexertional, nonradiating, no nausea or diaphoresis.  Possible PE given history of PE, missed Lovenox doses, and pleuritic nature of pain.  Low concern for aortic dissection as chest pain not rating to the back, nonacute onset, no pulse or neurodeficit on exam.  Unclear etiology of extremity pain at this time.  There is no joint swelling, erythema, or tenderness to suggest inflammatory or infectious process.  Anemia is considered.  Will send labs, CTA chest to evaluate for PE, duplex bilateral lower extremities given pain and history of VTE.  Disposition pending.

## 2023-09-08 NOTE — ED ADULT NURSE NOTE - OBJECTIVE STATEMENT
Patient presents to ED for worsening generalized joint pain x2 weeks. She is AA&Ox4, in no acute distress, calm, cooperative. She gave birth first week of July this year to healthy baby, and has been on 80mg Lovenox twice a day. Denies dizziness, SOB, dyspnea, N/V, fevers. Respirations even, unlabored on room air. No significant past medical history. 20G IV placed right AC, labs drawn and sent. Medicated as ordered.

## 2023-09-08 NOTE — ED PROVIDER NOTE - NSFOLLOWUPINSTRUCTIONS_ED_ALL_ED_FT
[facility]  General Internal Medicine  [address] [place] [address]  Phone: [phone]  Fax:     [facility]  Internal Medicine  [address] [place] [address]  Phone: [phone]  Fax: [phone]    [facility]  Cardiology  [address] [place] [address]  Phone: [phone]    Chest Pain  WHAT YOU NEED TO KNOW:  Chest pain can be caused by a range of conditions, from not serious to life-threatening. Chest pain can be a symptom of a digestive problem, such as acid reflux or a stomach ulcer. An anxiety attack or a strong emotion, such as anger, can also cause chest pain. Infection, inflammation, or a fracture in the bones or cartilage in your chest can cause pain or discomfort. Sometimes chest pain or pressure is caused by poor blood flow to your heart (angina). Chest pain may also be caused by life-threatening conditions such as a heart attack or blood clot in your lungs.   DISCHARGE INSTRUCTIONS:  Call 911 if:   •You have any of the following signs of a heart attack: ?Squeezing, pressure, or pain in your chest  ?You may also have any of the following: ?Discomfort or pain in your back, neck, jaw, stomach, or arm  ?Shortness of breath  ?Nausea or vomiting  ?Lightheadedness or a sudden cold sweat  Seek care immediately if:   •You have chest discomfort that gets worse, even with medicine.  •You cough or vomit blood.   •Your bowel movements are black or bloody.   •You cannot stop vomiting, or it hurts to swallow.   Contact your healthcare provider if:   •You have questions or concerns about your condition or care.  Medicines:   •Medicines may be given to treat the cause of your chest pain. Examples include pain medicine, anxiety medicine, or medicines to increase blood flow to your heart.   •Do not take certain medicines without asking your healthcare provider first. These include NSAIDs, herbal or vitamin supplements, or hormones (estrogen or progestin).   •Take your medicine as directed. Contact your healthcare provider if you think your medicine is not helping or if you have side effects. Tell him or her if you are allergic to any medicine. Keep a list of the medicines, vitamins, and herbs you take. Include the amounts, and when and why you take them. Bring the list or the pill bottles to follow-up visits. Carry your medicine list with you in case of an emergency.  Follow up with your healthcare provider within 72 hours, or as directed: You may need to return for more tests to find the cause of your chest pain. You may be referred to a specialist, such as a cardiologist or gastroenterologist. Write down your questions so you remember to ask them during your visits.   Healthy living tips: The following are general healthy guidelines. If your chest pain is caused by a heart problem, your healthcare provider will give you specific guidelines to follow.  •Do not smoke. Nicotine and other chemicals in cigarettes and cigars can cause lung and heart damage. Ask your healthcare provider for information if you currently smoke and need help to quit. E-cigarettes or smokeless tobacco still contain nicotine. Talk to your healthcare provider before you use these products.   •Eat a variety of healthy, low-fat, low-salt foods. Healthy foods include fruits, vegetables, whole-grain breads, low-fat dairy products, beans, lean meats, and fish. Ask for more information about a heart healthy diet.  •Drink plenty of water every day. Your body is made of mostly water. Water helps your body to control temperature and blood pressure. Ask your healthcare provider how much water you should drink every day.  •Ask about activity. Your healthcare provider will tell you which activities to limit or avoid. Ask when you can drive, return to work, and have sex. Ask about the best exercise plan for you.  •Maintain a healthy weight. Ask your healthcare provider how much you should weigh. Ask him or her to help you create a weight loss plan if you are overweight.   If you have a stent:   •Carry your stent card with you at all times.   •Let all healthcare providers know that you have a stent.     [facility]  General Internal Medicine  [address] [place] [address]  Phone: [phone]  Fax:     [facility]  Internal Medicine  [address] [place] [address]  Phone: [phone]  Fax: [phone]    [facility]  Cardiology  [address] [place] [id number]  Phone: [phone]    Chest Pain  WHAT YOU NEED TO KNOW:  Chest pain can be caused by a range of conditions, from not serious to life-threatening. Chest pain can be a symptom of a digestive problem, such as acid reflux or a stomach ulcer. An anxiety attack or a strong emotion, such as anger, can also cause chest pain. Infection, inflammation, or a fracture in the bones or cartilage in your chest can cause pain or discomfort. Sometimes chest pain or pressure is caused by poor blood flow to your heart (angina). Chest pain may also be caused by life-threatening conditions such as a heart attack or blood clot in your lungs.   DISCHARGE INSTRUCTIONS:  Call 911 if:   •You have any of the following signs of a heart attack: ?Squeezing, pressure, or pain in your chest  ?You may also have any of the following: ?Discomfort or pain in your back, neck, jaw, stomach, or arm  ?Shortness of breath  ?Nausea or vomiting  ?Lightheadedness or a sudden cold sweat  Seek care immediately if:   •You have chest discomfort that gets worse, even with medicine.  •You cough or vomit blood.   •Your bowel movements are black or bloody.   •You cannot stop vomiting, or it hurts to swallow.   Contact your healthcare provider if:   •You have questions or concerns about your condition or care.  Medicines:   •Medicines may be given to treat the cause of your chest pain. Examples include pain medicine, anxiety medicine, or medicines to increase blood flow to your heart.   •Do not take certain medicines without asking your healthcare provider first. These include NSAIDs, herbal or vitamin supplements, or hormones (estrogen or progestin).   •Take your medicine as directed. Contact your healthcare provider if you think your medicine is not helping or if you have side effects. Tell him or her if you are allergic to any medicine. Keep a list of the medicines, vitamins, and herbs you take. Include the amounts, and when and why you take them. Bring the list or the pill bottles to follow-up visits. Carry your medicine list with you in case of an emergency.  Follow up with your healthcare provider within 72 hours, or as directed: You may need to return for more tests to find the cause of your chest pain. You may be referred to a specialist, such as a cardiologist or gastroenterologist. Write down your questions so you remember to ask them during your visits.   Healthy living tips: The following are general healthy guidelines. If your chest pain is caused by a heart problem, your healthcare provider will give you specific guidelines to follow.  •Do not smoke. Nicotine and other chemicals in cigarettes and cigars can cause lung and heart damage. Ask your healthcare provider for information if you currently smoke and need help to quit. E-cigarettes or smokeless tobacco still contain nicotine. Talk to your healthcare provider before you use these products.   •Eat a variety of healthy, low-fat, low-salt foods. Healthy foods include fruits, vegetables, whole-grain breads, low-fat dairy products, beans, lean meats, and fish. Ask for more information about a heart healthy diet.  •Drink plenty of water every day. Your body is made of mostly water. Water helps your body to control temperature and blood pressure. Ask your healthcare provider how much water you should drink every day.  •Ask about activity. Your healthcare provider will tell you which activities to limit or avoid. Ask when you can drive, return to work, and have sex. Ask about the best exercise plan for you.  •Maintain a healthy weight. Ask your healthcare provider how much you should weigh. Ask him or her to help you create a weight loss plan if you are overweight.   If you have a stent:   •Carry your stent card with you at all times.   •Let all healthcare providers know that you have a stent.

## 2023-09-08 NOTE — ED PROVIDER NOTE - PROGRESS NOTE DETAILS
Labs nonactionable, CTA negative for PE, duplex negative.  Patient has follow-up with her hematologist on 9/15/2023.  Return precautions given.  Will discharge.

## 2024-07-12 NOTE — ED ADULT NURSE NOTE - SPO2 (%)
Last appointment: 10/26/2023  Next appointment: 10/28/2024  Last refill: 4/16/24    Requested Prescriptions     Pending Prescriptions Disp Refills    furosemide (LASIX) 20 MG tablet [Pharmacy Med Name: FUROSEMIDE 20MG TABLETS] 90 tablet 0     Sig: TAKE 1 TABLET BY MOUTH DAILY      100

## 2024-10-08 ENCOUNTER — EMERGENCY (EMERGENCY)
Facility: HOSPITAL | Age: 24
LOS: 1 days | Discharge: HOME | End: 2024-10-08
Attending: EMERGENCY MEDICINE | Admitting: EMERGENCY MEDICINE
Payer: COMMERCIAL

## 2024-10-08 VITALS
RESPIRATION RATE: 18 BRPM | DIASTOLIC BLOOD PRESSURE: 57 MMHG | HEART RATE: 79 BPM | TEMPERATURE: 98 F | SYSTOLIC BLOOD PRESSURE: 113 MMHG | OXYGEN SATURATION: 100 %

## 2024-10-08 VITALS
HEIGHT: 64 IN | SYSTOLIC BLOOD PRESSURE: 105 MMHG | RESPIRATION RATE: 20 BRPM | HEART RATE: 102 BPM | OXYGEN SATURATION: 100 % | WEIGHT: 143.96 LBS | TEMPERATURE: 98 F | DIASTOLIC BLOOD PRESSURE: 73 MMHG

## 2024-10-08 PROCEDURE — 71046 X-RAY EXAM CHEST 2 VIEWS: CPT | Mod: 26

## 2024-10-08 PROCEDURE — 93971 EXTREMITY STUDY: CPT | Mod: 26,LT

## 2024-10-08 PROCEDURE — 71101 X-RAY EXAM UNILAT RIBS/CHEST: CPT | Mod: 26,LT

## 2024-10-08 PROCEDURE — 73552 X-RAY EXAM OF FEMUR 2/>: CPT | Mod: 26,LT

## 2024-10-08 PROCEDURE — 73562 X-RAY EXAM OF KNEE 3: CPT | Mod: 26,LT

## 2024-10-08 PROCEDURE — 99285 EMERGENCY DEPT VISIT HI MDM: CPT

## 2024-10-08 RX ORDER — LIDOCAINE 50 MG/G
1 CREAM TOPICAL
Qty: 2 | Refills: 0
Start: 2024-10-08 | End: 2024-10-12

## 2024-10-08 RX ORDER — ACETAMINOPHEN 325 MG
325 TABLET ORAL ONCE
Refills: 0 | Status: COMPLETED | OUTPATIENT
Start: 2024-10-08 | End: 2024-10-08

## 2024-10-08 RX ORDER — LIDOCAINE 50 MG/G
1 CREAM TOPICAL ONCE
Refills: 0 | Status: COMPLETED | OUTPATIENT
Start: 2024-10-08 | End: 2024-10-08

## 2024-10-08 RX ADMIN — LIDOCAINE 1 PATCH: 50 CREAM TOPICAL at 14:44

## 2024-10-08 RX ADMIN — Medication 600 MILLIGRAM(S): at 14:44

## 2024-10-08 RX ADMIN — Medication 325 MILLIGRAM(S): at 14:44

## 2024-10-08 RX ADMIN — LIDOCAINE 1 PATCH: 50 CREAM TOPICAL at 15:11

## 2024-10-08 RX ADMIN — Medication 325 MILLIGRAM(S): at 16:43
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
## 2024-10-08 NOTE — ED PROVIDER NOTE - EXTREMITY EXAM
thight tender laterally, able to slr, pain on flexionof knee beyond 45 degrees/left lower extremity findings thigh tender laterally, able to slr, pain on flexion with crepitation on patella of knee beyond 45 degrees/left lower extremity findings
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
## 2024-10-08 NOTE — ED PROVIDER NOTE - NSFOLLOWUPINSTRUCTIONS_ED_ALL_ED_FT
Rest , elevate ,  knee immobilizer,  remove when stationary and do some calf exercises to decrease risk of DVT   Motrin 600 mg every 8 hours, take with food  Tylenol 500 mg with motrin   return for leg swelling or new or concerning symptoms    Advance activity as tolerated.  Continue all previously prescribed medications as directed unless otherwise instructed.  Follow up with your primary care physician in 48-72 hours- bring copies of your results.  Return to the ER for worsening or persistent symptoms, and/or ANY NEW OR CONCERNING SYMPTOMS. If you have issues obtaining follow up, please call: [phone] to obtain a doctor or specialist who takes your insurance in your area.  You may call [phone] to make an appointment with the internal medicine clinic.

## 2024-10-08 NOTE — ED PROVIDER NOTE - PATIENT PORTAL LINK FT
You can access the FollowMyHealth Patient Portal offered by [facility] by registering at the following website: [email or web address]. By joining [employer]’s FollowMyHealth portal, you will also be able to view your health information using other applications (apps) compatible with our system.
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
## 2024-10-16 ENCOUNTER — APPOINTMENT (OUTPATIENT)
Age: 24
End: 2024-10-16
Payer: COMMERCIAL

## 2024-10-16 VITALS
BODY MASS INDEX: 25.52 KG/M2 | OXYGEN SATURATION: 95 % | SYSTOLIC BLOOD PRESSURE: 103 MMHG | WEIGHT: 144 LBS | DIASTOLIC BLOOD PRESSURE: 71 MMHG | HEART RATE: 96 BPM | HEIGHT: 63 IN

## 2024-10-16 DIAGNOSIS — S80.02XA CONTUSION OF LEFT KNEE, INITIAL ENCOUNTER: ICD-10-CM

## 2024-10-16 DIAGNOSIS — M54.16 RADICULOPATHY, LUMBAR REGION: ICD-10-CM

## 2024-10-16 PROCEDURE — 99204 OFFICE O/P NEW MOD 45 MIN: CPT

## 2024-11-21 ENCOUNTER — EMERGENCY (EMERGENCY)
Facility: HOSPITAL | Age: 24
LOS: 1 days | Discharge: HOME | End: 2024-11-21
Attending: EMERGENCY MEDICINE | Admitting: EMERGENCY MEDICINE
Payer: MEDICAID

## 2024-11-21 VITALS
RESPIRATION RATE: 16 BRPM | WEIGHT: 143.96 LBS | DIASTOLIC BLOOD PRESSURE: 63 MMHG | TEMPERATURE: 98 F | SYSTOLIC BLOOD PRESSURE: 104 MMHG | HEART RATE: 92 BPM | HEIGHT: 64 IN | OXYGEN SATURATION: 97 %

## 2024-11-21 VITALS
DIASTOLIC BLOOD PRESSURE: 61 MMHG | HEART RATE: 86 BPM | OXYGEN SATURATION: 99 % | RESPIRATION RATE: 17 BRPM | TEMPERATURE: 98 F | SYSTOLIC BLOOD PRESSURE: 114 MMHG

## 2024-11-21 LAB
ALBUMIN SERPL ELPH-MCNC: 4.3 G/DL — SIGNIFICANT CHANGE UP (ref 3.3–5)
ALP SERPL-CCNC: 50 U/L — SIGNIFICANT CHANGE UP (ref 40–120)
ALT FLD-CCNC: 14 U/L — SIGNIFICANT CHANGE UP (ref 4–33)
ANION GAP SERPL CALC-SCNC: 14 MMOL/L — SIGNIFICANT CHANGE UP (ref 7–14)
APPEARANCE UR: ABNORMAL
AST SERPL-CCNC: 17 U/L — SIGNIFICANT CHANGE UP (ref 4–32)
BACTERIA # UR AUTO: ABNORMAL /HPF
BASOPHILS # BLD AUTO: 0.03 K/UL — SIGNIFICANT CHANGE UP (ref 0–0.2)
BASOPHILS NFR BLD AUTO: 0.2 % — SIGNIFICANT CHANGE UP (ref 0–2)
BILIRUB SERPL-MCNC: 0.3 MG/DL — SIGNIFICANT CHANGE UP (ref 0.2–1.2)
BILIRUB UR-MCNC: NEGATIVE — SIGNIFICANT CHANGE UP
BUN SERPL-MCNC: 19 MG/DL — SIGNIFICANT CHANGE UP (ref 7–23)
CALCIUM SERPL-MCNC: 9.3 MG/DL — SIGNIFICANT CHANGE UP (ref 8.4–10.5)
CAST: 1 /LPF — SIGNIFICANT CHANGE UP (ref 0–4)
CHLORIDE SERPL-SCNC: 103 MMOL/L — SIGNIFICANT CHANGE UP (ref 98–107)
CO2 SERPL-SCNC: 21 MMOL/L — LOW (ref 22–31)
COLOR SPEC: YELLOW — SIGNIFICANT CHANGE UP
CREAT SERPL-MCNC: 0.87 MG/DL — SIGNIFICANT CHANGE UP (ref 0.5–1.3)
DIFF PNL FLD: NEGATIVE — SIGNIFICANT CHANGE UP
EGFR: 94 ML/MIN/1.73M2 — SIGNIFICANT CHANGE UP
EOSINOPHIL # BLD AUTO: 0.04 K/UL — SIGNIFICANT CHANGE UP (ref 0–0.5)
EOSINOPHIL NFR BLD AUTO: 0.3 % — SIGNIFICANT CHANGE UP (ref 0–6)
GLUCOSE SERPL-MCNC: 76 MG/DL — SIGNIFICANT CHANGE UP (ref 70–99)
GLUCOSE UR QL: NEGATIVE MG/DL — SIGNIFICANT CHANGE UP
HCG SERPL-ACNC: <1 MIU/ML — SIGNIFICANT CHANGE UP
HCT VFR BLD CALC: 43.6 % — SIGNIFICANT CHANGE UP (ref 34.5–45)
HGB BLD-MCNC: 14.3 G/DL — SIGNIFICANT CHANGE UP (ref 11.5–15.5)
IANC: 12.61 K/UL — HIGH (ref 1.8–7.4)
IMM GRANULOCYTES NFR BLD AUTO: 0.4 % — SIGNIFICANT CHANGE UP (ref 0–0.9)
KETONES UR-MCNC: 40 MG/DL
LEUKOCYTE ESTERASE UR-ACNC: NEGATIVE — SIGNIFICANT CHANGE UP
LIDOCAIN IGE QN: 28 U/L — SIGNIFICANT CHANGE UP (ref 7–60)
LYMPHOCYTES # BLD AUTO: 0.59 K/UL — LOW (ref 1–3.3)
LYMPHOCYTES # BLD AUTO: 4.2 % — LOW (ref 13–44)
MCHC RBC-ENTMCNC: 27.2 PG — SIGNIFICANT CHANGE UP (ref 27–34)
MCHC RBC-ENTMCNC: 32.8 G/DL — SIGNIFICANT CHANGE UP (ref 32–36)
MCV RBC AUTO: 82.9 FL — SIGNIFICANT CHANGE UP (ref 80–100)
MONOCYTES # BLD AUTO: 0.81 K/UL — SIGNIFICANT CHANGE UP (ref 0–0.9)
MONOCYTES NFR BLD AUTO: 5.7 % — SIGNIFICANT CHANGE UP (ref 2–14)
NEUTROPHILS # BLD AUTO: 12.61 K/UL — HIGH (ref 1.8–7.4)
NEUTROPHILS NFR BLD AUTO: 89.2 % — HIGH (ref 43–77)
NITRITE UR-MCNC: NEGATIVE — SIGNIFICANT CHANGE UP
NRBC # BLD: 0 /100 WBCS — SIGNIFICANT CHANGE UP (ref 0–0)
NRBC # FLD: 0 K/UL — SIGNIFICANT CHANGE UP (ref 0–0)
PH UR: 5.5 — SIGNIFICANT CHANGE UP (ref 5–8)
PLATELET # BLD AUTO: 343 K/UL — SIGNIFICANT CHANGE UP (ref 150–400)
POTASSIUM SERPL-MCNC: 3.9 MMOL/L — SIGNIFICANT CHANGE UP (ref 3.5–5.3)
POTASSIUM SERPL-SCNC: 3.9 MMOL/L — SIGNIFICANT CHANGE UP (ref 3.5–5.3)
PROT SERPL-MCNC: 7.6 G/DL — SIGNIFICANT CHANGE UP (ref 6–8.3)
PROT UR-MCNC: NEGATIVE MG/DL — SIGNIFICANT CHANGE UP
RBC # BLD: 5.26 M/UL — HIGH (ref 3.8–5.2)
RBC # FLD: 12.9 % — SIGNIFICANT CHANGE UP (ref 10.3–14.5)
RBC CASTS # UR COMP ASSIST: 1 /HPF — SIGNIFICANT CHANGE UP (ref 0–4)
REVIEW: SIGNIFICANT CHANGE UP
SODIUM SERPL-SCNC: 138 MMOL/L — SIGNIFICANT CHANGE UP (ref 135–145)
SP GR SPEC: 1.03 — HIGH (ref 1–1.03)
SQUAMOUS # UR AUTO: 16 /HPF — HIGH (ref 0–5)
UROBILINOGEN FLD QL: 0.2 MG/DL — SIGNIFICANT CHANGE UP (ref 0.2–1)
WBC # BLD: 14.14 K/UL — HIGH (ref 3.8–10.5)
WBC # FLD AUTO: 14.14 K/UL — HIGH (ref 3.8–10.5)
WBC UR QL: 6 /HPF — HIGH (ref 0–5)

## 2024-11-21 PROCEDURE — 99284 EMERGENCY DEPT VISIT MOD MDM: CPT

## 2024-11-21 RX ORDER — ONDANSETRON HYDROCHLORIDE 2 MG/ML
4 INJECTION, SOLUTION INTRAMUSCULAR; INTRAVENOUS ONCE
Refills: 0 | Status: COMPLETED | OUTPATIENT
Start: 2024-11-21 | End: 2024-11-21

## 2024-11-21 RX ORDER — SODIUM CHLORIDE 9 MG/ML
1000 INJECTION, SOLUTION INTRAMUSCULAR; INTRAVENOUS; SUBCUTANEOUS ONCE
Refills: 0 | Status: COMPLETED | OUTPATIENT
Start: 2024-11-21 | End: 2024-11-21

## 2024-11-21 RX ORDER — ACETAMINOPHEN 500 MG
1000 TABLET ORAL ONCE
Refills: 0 | Status: COMPLETED | OUTPATIENT
Start: 2024-11-21 | End: 2024-11-21

## 2024-11-21 RX ORDER — METOCLOPRAMIDE HCL 10 MG
10 TABLET ORAL ONCE
Refills: 0 | Status: COMPLETED | OUTPATIENT
Start: 2024-11-21 | End: 2024-11-21

## 2024-11-21 RX ADMIN — ONDANSETRON HYDROCHLORIDE 4 MILLIGRAM(S): 2 INJECTION, SOLUTION INTRAMUSCULAR; INTRAVENOUS at 15:45

## 2024-11-21 RX ADMIN — Medication 400 MILLIGRAM(S): at 15:46

## 2024-11-21 RX ADMIN — Medication 10 MILLIGRAM(S): at 16:58

## 2024-11-21 RX ADMIN — SODIUM CHLORIDE 1000 MILLILITER(S): 9 INJECTION, SOLUTION INTRAMUSCULAR; INTRAVENOUS; SUBCUTANEOUS at 15:46

## 2024-11-21 NOTE — ED PROVIDER NOTE - ATTENDING CONTRIBUTION TO CARE
The patient is a 24y Female who has a past medical and surgery history of Pulmonary embolism (2/2 OCP use)  PTED c/o n/v/d with lower abdominal pain x 2 days She denies fever chills dys/py/hematuria Of note HIV screen performed in October (HIE) negative    Vital Signs Last 24 Hrs  T(F): 98.3 HR: 92 BP: 104/63 RR: 16 SpO2: 97% (21 Nov 2024 14:10)   PE: as described; my additions and exceptions are noted in the chart    DATA:  EKG: pending at time of evaluation  LAB: Pending at time of evaluation    IMPRESSION/RISK:  Dx= Viral illness  Consideration include  Plan  acetaminophen   IVPB .. 1000 milliGRAM(s) IV Intermittent  ondansetron Injectable 4 milliGRAM(s) IV Push  sodium chloride 0.9% Bolus 1000 milliLiter(s) IV Bolus    Reassess  Dispo as per results and response

## 2024-11-21 NOTE — ED ADULT NURSE NOTE - NSFALLUNIVINTERV_ED_ALL_ED
Bed/Stretcher in lowest position, wheels locked, appropriate side rails in place/Call bell, personal items and telephone in reach/Instruct patient to call for assistance before getting out of bed/chair/stretcher/Non-slip footwear applied when patient is off stretcher/[place] to call system/Physically safe environment - no spills, clutter or unnecessary equipment/Purposeful proactive rounding/Room/bathroom lighting operational, light cord in reach

## 2024-11-21 NOTE — ED ADULT NURSE NOTE - OBJECTIVE STATEMENT
Patient is a 25 y/o female with complaint of nausea, diarrhea, and vomiting since yesterday. Patient states that her 1.5 year old daughter had the same symptoms about a day and a half ago. Denies fever, chills, SOB, chest pain, recent travel. A/O x 4, NAD, RR even and unlabored, lung sound clear b/l lobes throughout with no adventitious sounds, abdomen soft and TTP lower abdomen, non-distended. Medications and labs as per MD orders, patient updated on plan of care, safety maintained, # 20G LAC placed by Kiesha LEWIS RN. pending MD re-evaluation

## 2024-11-21 NOTE — ED ADULT NURSE REASSESSMENT NOTE - STATUS
Reason for Disposition  • Unable to have a bowel movement (BM) without laxative or enema    Protocols used: CONSTIPATION-A-AH    
Divya Luis  1995  Preferred Contact Number: [phone] (mobile)        Patient is calling to schedule a new patient appointment with Zach Genao MD.     Medicaid      Who referred: writer listed all doctors  Any immediate health concerns? Yes constipation, and she has been bleeding rectally   Was the patient offered a sooner position with other providers? No    Preferred times to be called: sent to triage, because of the bleeding      
Onset: two months  Location / description: Patient states she gets constipated and uses her finger to stimulate a BM. She states her finger comes out bright red. She has a BM after doing this and does not see any blood on the stool. No blood on the tissue. She states she does not take any medication, but has changed her diet to mostly vegetables and fruits, and drinks. No nausea, no vomiting. 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   Precipitating Factors: constipation  Pain Scale (1 - 10), 10 highest: 0  Associated Symptoms: Facto IV  What improves/worsens symptoms: no digital stimulation  Symptom specific medications: none  LMP : just ended yesterday  Are you pregnant or breast feeding: no  Recent Care: was a Patient of Dr. Warren    Patient is asking to establish care with Dr. Genao. She would like to be seen for this rectal bleeding issue tomorrow if possible. She would then set up an appointment to establish care, if Dr. Genao is taking new patients. Please call Patient back. (Discussed not doing a digital stimulation for BMs, but rather trying Senna S or Miralax to avoid constipation.)        
Please schedule in appropriate new patient slot--make sure is 30 minutes.  Thanks.      If patient needs care prior to first available should utilize ED/Urgent Care.   
moved to zone room 3 for comfort

## 2024-11-21 NOTE — ED PROVIDER NOTE - CLINICAL SUMMARY MEDICAL DECISION MAKING FREE TEXT BOX
24-year-old female with no pertinent past medical history presenting with nausea, vomiting, diarrhea over the past 2 days.  Diarrhea and vomiting nonbloody, emesis yellowish and likely bilious appearing.  Patient unable to tolerate p.o.  Reports daughter had similar symptoms and is now improved.  Has not been taking any medications for this.  Having some associated periumbilical abdominal pain as well as some epigastric discomfort.  Chills but no fevers.  Denies chest pain, shortness of breath, urinary symptoms, abnormal vaginal bleeding or discharge.  LMP 2 weeks ago.  No history of abdominal surgeries. No recent travel or antibiotic use. Denies alcohol, drug, marijuana use.    On exam VSS, not tachycardic or hypotensive, afebrile.  Abdomen is soft has some mild tenderness in the epigastrium and periumbilical area but otherwise nonsurgical abdomen.  Rest of exam is unremarkable with clear lungs.  Suspect viral gastroenteritis in the setting of known sick contact.  Will treat symptomatically with Zofran and fluids.  In the meantime we will obtain CBC, CMP, lipase as well as urinalysis then reassess

## 2024-11-21 NOTE — ED PROVIDER NOTE - PHYSICAL EXAMINATION
GENERAL: Awake, alert, NAD  HEAD: NC/AT, neck supple, moist mucous membranes  EYES: PERRL, EOM grossly intact, sclera anicteric  LUNGS: normal WOB on RA, CTAB, no wheezes or crackles   CARDIAC: RRR, no m/r/g  ABDOMEN: Soft, minimally tender in epigastrium  BACK: no CVA tenderness  EXT: No edema, no calf tenderness, no deformities.  NEURO: A&Ox3. Moving all extremities.  SKIN: Warm and dry. No rash.  PSYCH: Normal affect.

## 2024-11-21 NOTE — ED PROVIDER NOTE - PATIENT PORTAL LINK FT
You can access the FollowMyHealth Patient Portal offered by [facility] by registering at the following website: [email or web address]. By joining [employer]’s FollowMyHealth portal, you will also be able to view your health information using other applications (apps) compatible with our system.

## 2024-11-21 NOTE — ED ADULT NURSE NOTE - PRIMARY CARE PROVIDER
Unknown

## 2025-02-17 NOTE — ED ADULT NURSE NOTE - NS ED PATIENT SAFETY CONCERN
Pt states she choked on her water and feels like she has a wet cough. MD notified. Chest Xray ordered.    No

## 2025-06-20 ENCOUNTER — EMERGENCY (EMERGENCY)
Facility: HOSPITAL | Age: 25
LOS: 1 days | End: 2025-06-20
Attending: EMERGENCY MEDICINE
Payer: MEDICAID

## 2025-06-20 VITALS
WEIGHT: 143.08 LBS | TEMPERATURE: 98 F | SYSTOLIC BLOOD PRESSURE: 104 MMHG | RESPIRATION RATE: 18 BRPM | DIASTOLIC BLOOD PRESSURE: 72 MMHG | HEIGHT: 63 IN | OXYGEN SATURATION: 99 % | HEART RATE: 83 BPM

## 2025-06-20 LAB — HCG UR QL: NEGATIVE — SIGNIFICANT CHANGE UP

## 2025-06-20 PROCEDURE — 99283 EMERGENCY DEPT VISIT LOW MDM: CPT

## 2025-06-20 PROCEDURE — 81025 URINE PREGNANCY TEST: CPT

## 2025-06-20 PROCEDURE — 99284 EMERGENCY DEPT VISIT MOD MDM: CPT

## 2025-06-20 RX ORDER — OXYCODONE HYDROCHLORIDE 30 MG/1
5 TABLET ORAL ONCE
Refills: 0 | Status: DISCONTINUED | OUTPATIENT
Start: 2025-06-20 | End: 2025-06-20

## 2025-06-20 RX ORDER — METHOCARBAMOL 500 MG/1
1500 TABLET, FILM COATED ORAL ONCE
Refills: 0 | Status: COMPLETED | OUTPATIENT
Start: 2025-06-20 | End: 2025-06-20

## 2025-06-20 RX ORDER — DIAZEPAM 5 MG/1
5 TABLET ORAL ONCE
Refills: 0 | Status: DISCONTINUED | OUTPATIENT
Start: 2025-06-20 | End: 2025-06-20

## 2025-06-20 RX ORDER — IBUPROFEN 200 MG
600 TABLET ORAL ONCE
Refills: 0 | Status: COMPLETED | OUTPATIENT
Start: 2025-06-20 | End: 2025-06-20

## 2025-06-20 RX ORDER — ACETAMINOPHEN 500 MG/5ML
975 LIQUID (ML) ORAL ONCE
Refills: 0 | Status: COMPLETED | OUTPATIENT
Start: 2025-06-20 | End: 2025-06-20

## 2025-06-20 RX ORDER — ACETAMINOPHEN 500 MG/5ML
2 LIQUID (ML) ORAL
Qty: 24 | Refills: 0
Start: 2025-06-20 | End: 2025-06-22

## 2025-06-20 RX ORDER — METHOCARBAMOL 500 MG/1
2 TABLET, FILM COATED ORAL
Qty: 18 | Refills: 0
Start: 2025-06-20 | End: 2025-06-22

## 2025-06-20 RX ORDER — LIDOCAINE HYDROCHLORIDE 20 MG/ML
1 JELLY TOPICAL ONCE
Refills: 0 | Status: COMPLETED | OUTPATIENT
Start: 2025-06-20 | End: 2025-06-20

## 2025-06-20 RX ORDER — IBUPROFEN 200 MG
1 TABLET ORAL
Qty: 15 | Refills: 0
Start: 2025-06-20 | End: 2025-06-24

## 2025-06-20 RX ADMIN — LIDOCAINE HYDROCHLORIDE 1 PATCH: 20 JELLY TOPICAL at 10:33

## 2025-06-20 RX ADMIN — METHOCARBAMOL 1500 MILLIGRAM(S): 500 TABLET, FILM COATED ORAL at 10:35

## 2025-06-20 RX ADMIN — Medication 600 MILLIGRAM(S): at 10:34

## 2025-06-20 RX ADMIN — Medication 975 MILLIGRAM(S): at 10:34

## 2025-06-20 RX ADMIN — DIAZEPAM 5 MILLIGRAM(S): 5 TABLET ORAL at 12:00

## 2025-06-20 NOTE — ED ADULT NURSE NOTE - OBJECTIVE STATEMENT
25 year old female, A&OX4, no PMH, presenting to ED c/o neck pain starting yesterday. States she started to have left sided neck pain yesterday afternoon with difficulty turning neck to the left. States pain radiates to the upper back and left shoulder. Has taken tylenol at home without relief.  Works as a  and carries a heavy bag for work. 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 Denies CP, SOB, HA, n/v/d, abdominal pain, difficulty urinating, fevers and chills. Heart rate and  regular. Respirations clear and equal bilaterally. Peripheral pulses strong and equal bilaterally.UE strength and motor intact. Safety and comfort measures maintained.

## 2025-06-20 NOTE — ED PROVIDER NOTE - PATIENT PORTAL LINK FT
You can access the FollowMyHealth Patient Portal offered by [facility] by registering at the following website: [email or web address]. By joining [facility]’s FollowMyHealth portal, you will also be able to view your health information using other applications (apps) compatible with our system.

## 2025-06-20 NOTE — ED PROVIDER NOTE - CLINICAL SUMMARY MEDICAL DECISION MAKING FREE TEXT BOX
26 y/o F with no significant PMHx presents to ED for evaluation of neck pain onset yesterday. Patient works as , was off from work yesterday, but started to have left sided neck pain in the afternoon. Without  inciting trauma. Patient without midline spine tenderness, + paraspinal cervical spine tenderness with limited ROM. Suspect torticollis/muscle strain. Will check urine pregnancy test. Treat with NSAIDs, tylenol, muscle relaxer and lidocaine. Likely d/c once improvement in symptoms. 24 y/o F with no significant PMHx presents to ED for evaluation of neck pain onset yesterday. Patient works as , was off from work yesterday, but started to have left sided neck pain in the afternoon. Without  inciting trauma. Patient without midline spine tenderness, + paraspinal cervical spine tenderness with limited ROM. Suspect torticollis/muscle strain. Will check urine pregnancy test. Treat with NSAIDs, tylenol, muscle relaxer and lidocaine. Likely d/c once improvement in symptoms.    MAURI Macedo MD: Agree with resident/ACP MDM, assessment and plan as above.

## 2025-06-20 NOTE — ED PROVIDER NOTE - NSTIMEPROVIDERCAREINITIATE_GEN_ER
Refill request for acyclovir   LV:04/12/2018     One courtesy refill given with must make appointment for further refills   Jodi Velasquez RN     20-Jun-2025 09:24 Labs/Imaging Studies/EKG

## 2025-06-20 NOTE — ED PROVIDER NOTE - OBJECTIVE STATEMENT
24 y/o F with no significant PMHx presents to ED for evaluation of neck pain onset yesterday. Patient works as , was off from work yesterday, but started to have left sided neck pain in the afternoon. With associated difficulty turning neck to left side. 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 Pain is now radiating into upper back and left shoulder. Took Tylenol yesterday without relief. Denies fevers, neck stiffness, headache, numbness or tingling in the arm, weakness, decreased  strength or dropping items with her left arm. Endorses mild congestion/cold symptoms over past 2-3 days. NKDA. Does not  believe she is currently pregnant.

## 2025-06-20 NOTE — ED PROVIDER NOTE - NSFOLLOWUPINSTRUCTIONS_ED_ALL_ED_FT
Today you were seen in the ED for evaluation of your symptoms.     A copy of your results are attached in this document below.     A prescription has been sent to your pharmacy electronically. Please take this as prescribed. This medication can make you drowsy, please do not drive or drink alcohol while taking this medication.     SEEK IMMEDIATE MEDICAL CARE IF YOU HAVE ANY OF THE FOLLOWING SYMPTOMS: severe chest pain, difficulty breathing, fainting, fevers that persist despite taking medications over the counter, vomiting blood, dark or bloody stools, inability to tolerate eating and drinking food by mouth

## 2025-06-20 NOTE — ED PROVIDER NOTE - PHYSICAL EXAMINATION
Gen: well appearing, in no acute distress   Head: normal appearing  HEENT: normal conjunctiva, oral mucosa moist, vision grossly intact   Lung: no respiratory distress, speaking in full sentences, CTA b/l, no wheeze, crackles or rhonchi   CV: regular rate and rhythm, no murmurs  Abd: soft, non distended, non tender   MSK: no visible deformities, ambulating without difficulty, no midline spine tenderness, L paraspinal cervical spine tenderness, hypertonicity over L upper trapezius muscle, with limited ROM in turning head to L, normal and symmetric  strength in YOHANA UE, normal sensation, capillary refill and distal pulses in LUE   Neuro: No focal deficits, AAOx3  Skin: Warm, no rashes   Psych: normal affect

## 2025-06-20 NOTE — ED PROVIDER NOTE - DIFFERENTIAL DIAGNOSIS
Differential Diagnosis Ddx includes, however, is not limited to: cervical strain, sprained muscle, other

## 2025-08-17 ENCOUNTER — EMERGENCY (EMERGENCY)
Facility: HOSPITAL | Age: 25
LOS: 1 days | End: 2025-08-17
Attending: EMERGENCY MEDICINE
Payer: MEDICAID

## 2025-08-17 VITALS
SYSTOLIC BLOOD PRESSURE: 126 MMHG | DIASTOLIC BLOOD PRESSURE: 77 MMHG | TEMPERATURE: 98 F | HEART RATE: 78 BPM | WEIGHT: 139.99 LBS | RESPIRATION RATE: 20 BRPM | HEIGHT: 63 IN | OXYGEN SATURATION: 99 %

## 2025-08-17 LAB
APPEARANCE UR: CLEAR — SIGNIFICANT CHANGE UP
BACTERIA # UR AUTO: NEGATIVE /HPF — SIGNIFICANT CHANGE UP
BASOPHILS # BLD AUTO: 0.05 K/UL — SIGNIFICANT CHANGE UP (ref 0–0.2)
BASOPHILS NFR BLD AUTO: 0.5 % — SIGNIFICANT CHANGE UP (ref 0–2)
BILIRUB UR-MCNC: NEGATIVE — SIGNIFICANT CHANGE UP
CAST: 0 /LPF — SIGNIFICANT CHANGE UP (ref 0–4)
COLOR SPEC: YELLOW — SIGNIFICANT CHANGE UP
DIFF PNL FLD: ABNORMAL
EOSINOPHIL # BLD AUTO: 0.11 K/UL — SIGNIFICANT CHANGE UP (ref 0–0.5)
EOSINOPHIL NFR BLD AUTO: 1.2 % — SIGNIFICANT CHANGE UP (ref 0–6)
GLUCOSE UR QL: NEGATIVE MG/DL — SIGNIFICANT CHANGE UP
HCT VFR BLD CALC: 40.4 % — SIGNIFICANT CHANGE UP (ref 34.5–45)
HGB BLD-MCNC: 12.8 G/DL — SIGNIFICANT CHANGE UP (ref 11.5–15.5)
IMM GRANULOCYTES # BLD AUTO: 0.03 K/UL — SIGNIFICANT CHANGE UP (ref 0–0.07)
IMM GRANULOCYTES NFR BLD AUTO: 0.3 % — SIGNIFICANT CHANGE UP (ref 0–0.9)
KETONES UR QL: NEGATIVE MG/DL — SIGNIFICANT CHANGE UP
LEUKOCYTE ESTERASE UR-ACNC: ABNORMAL
LYMPHOCYTES # BLD AUTO: 3.57 K/UL — HIGH (ref 1–3.3)
LYMPHOCYTES NFR BLD AUTO: 38.3 % — SIGNIFICANT CHANGE UP (ref 13–44)
MCHC RBC-ENTMCNC: 27.1 PG — SIGNIFICANT CHANGE UP (ref 27–34)
MCHC RBC-ENTMCNC: 31.7 G/DL — LOW (ref 32–36)
MCV RBC AUTO: 85.4 FL — SIGNIFICANT CHANGE UP (ref 80–100)
MONOCYTES # BLD AUTO: 0.79 K/UL — SIGNIFICANT CHANGE UP (ref 0–0.9)
MONOCYTES NFR BLD AUTO: 8.5 % — SIGNIFICANT CHANGE UP (ref 2–14)
NEUTROPHILS # BLD AUTO: 4.78 K/UL — SIGNIFICANT CHANGE UP (ref 1.8–7.4)
NEUTROPHILS NFR BLD AUTO: 51.2 % — SIGNIFICANT CHANGE UP (ref 43–77)
NITRITE UR-MCNC: NEGATIVE — SIGNIFICANT CHANGE UP
NRBC # BLD AUTO: 0 K/UL — SIGNIFICANT CHANGE UP (ref 0–0)
NRBC # FLD: 0 K/UL — SIGNIFICANT CHANGE UP (ref 0–0)
NRBC BLD AUTO-RTO: 0 /100 WBCS — SIGNIFICANT CHANGE UP (ref 0–0)
PH UR: 6 — SIGNIFICANT CHANGE UP (ref 5–8)
PLATELET # BLD AUTO: 351 K/UL — SIGNIFICANT CHANGE UP (ref 150–400)
PMV BLD: 8.9 FL — SIGNIFICANT CHANGE UP (ref 7–13)
PROT UR-MCNC: NEGATIVE MG/DL — SIGNIFICANT CHANGE UP
RBC # BLD: 4.73 M/UL — SIGNIFICANT CHANGE UP (ref 3.8–5.2)
RBC # FLD: 11.9 % — SIGNIFICANT CHANGE UP (ref 10.3–14.5)
RBC CASTS # UR COMP ASSIST: 1 /HPF — SIGNIFICANT CHANGE UP (ref 0–4)
SP GR SPEC: 1.02 — SIGNIFICANT CHANGE UP (ref 1–1.03)
SQUAMOUS # UR AUTO: 2 /HPF — SIGNIFICANT CHANGE UP (ref 0–5)
UROBILINOGEN FLD QL: 0.2 MG/DL — SIGNIFICANT CHANGE UP (ref 0.2–1)
WBC # BLD: 9.33 K/UL — SIGNIFICANT CHANGE UP (ref 3.8–10.5)
WBC # FLD AUTO: 9.33 K/UL — SIGNIFICANT CHANGE UP (ref 3.8–10.5)
WBC UR QL: 7 /HPF — HIGH (ref 0–5)

## 2025-08-17 PROCEDURE — 99284 EMERGENCY DEPT VISIT MOD MDM: CPT

## 2025-08-17 PROCEDURE — 73090 X-RAY EXAM OF FOREARM: CPT

## 2025-08-17 PROCEDURE — 73090 X-RAY EXAM OF FOREARM: CPT | Mod: 26,LT

## 2025-08-17 RX ORDER — ACETAMINOPHEN 500 MG/5ML
650 LIQUID (ML) ORAL ONCE
Refills: 0 | Status: COMPLETED | OUTPATIENT
Start: 2025-08-17 | End: 2025-08-17

## 2025-08-17 RX ADMIN — Medication 650 MILLIGRAM(S): at 23:52

## 2025-08-18 VITALS
OXYGEN SATURATION: 98 % | SYSTOLIC BLOOD PRESSURE: 102 MMHG | TEMPERATURE: 98 F | RESPIRATION RATE: 19 BRPM | HEART RATE: 70 BPM | DIASTOLIC BLOOD PRESSURE: 62 MMHG

## 2025-08-18 LAB
ALBUMIN SERPL ELPH-MCNC: 4.4 G/DL — SIGNIFICANT CHANGE UP (ref 3.3–5)
ALP SERPL-CCNC: 57 U/L — SIGNIFICANT CHANGE UP (ref 40–120)
ALT FLD-CCNC: 12 U/L — SIGNIFICANT CHANGE UP (ref 10–45)
ANION GAP SERPL CALC-SCNC: 12 MMOL/L — SIGNIFICANT CHANGE UP (ref 5–17)
AST SERPL-CCNC: 17 U/L — SIGNIFICANT CHANGE UP (ref 10–40)
BILIRUB SERPL-MCNC: 0.2 MG/DL — SIGNIFICANT CHANGE UP (ref 0.2–1.2)
BUN SERPL-MCNC: 14 MG/DL — SIGNIFICANT CHANGE UP (ref 7–23)
CALCIUM SERPL-MCNC: 9.8 MG/DL — SIGNIFICANT CHANGE UP (ref 8.4–10.5)
CHLORIDE SERPL-SCNC: 104 MMOL/L — SIGNIFICANT CHANGE UP (ref 96–108)
CO2 SERPL-SCNC: 23 MMOL/L — SIGNIFICANT CHANGE UP (ref 22–31)
CREAT SERPL-MCNC: 0.92 MG/DL — SIGNIFICANT CHANGE UP (ref 0.5–1.3)
EGFR: 88 ML/MIN/1.73M2 — SIGNIFICANT CHANGE UP
EGFR: 88 ML/MIN/1.73M2 — SIGNIFICANT CHANGE UP
GLUCOSE SERPL-MCNC: 96 MG/DL — SIGNIFICANT CHANGE UP (ref 70–99)
HCG SERPL-ACNC: <2 MIU/ML — SIGNIFICANT CHANGE UP
POTASSIUM SERPL-MCNC: 3.8 MMOL/L — SIGNIFICANT CHANGE UP (ref 3.5–5.3)
POTASSIUM SERPL-SCNC: 3.8 MMOL/L — SIGNIFICANT CHANGE UP (ref 3.5–5.3)
PROT SERPL-MCNC: 7.6 G/DL — SIGNIFICANT CHANGE UP (ref 6–8.3)
SODIUM SERPL-SCNC: 139 MMOL/L — SIGNIFICANT CHANGE UP (ref 135–145)

## 2025-08-18 PROCEDURE — 84702 CHORIONIC GONADOTROPIN TEST: CPT

## 2025-08-18 PROCEDURE — 80053 COMPREHEN METABOLIC PANEL: CPT

## 2025-08-18 PROCEDURE — 76830 TRANSVAGINAL US NON-OB: CPT | Mod: 26

## 2025-08-18 PROCEDURE — 99284 EMERGENCY DEPT VISIT MOD MDM: CPT | Mod: 25

## 2025-08-18 PROCEDURE — 81001 URINALYSIS AUTO W/SCOPE: CPT

## 2025-08-18 PROCEDURE — 73090 X-RAY EXAM OF FOREARM: CPT

## 2025-08-18 PROCEDURE — 87086 URINE CULTURE/COLONY COUNT: CPT

## 2025-08-18 PROCEDURE — 93975 VASCULAR STUDY: CPT

## 2025-08-18 PROCEDURE — 93975 VASCULAR STUDY: CPT | Mod: 26

## 2025-08-18 PROCEDURE — 76830 TRANSVAGINAL US NON-OB: CPT

## 2025-08-18 PROCEDURE — 85025 COMPLETE CBC W/AUTO DIFF WBC: CPT

## 2025-08-19 LAB
CULTURE RESULTS: SIGNIFICANT CHANGE UP
SPECIMEN SOURCE: SIGNIFICANT CHANGE UP